# Patient Record
Sex: MALE | Employment: FULL TIME | URBAN - METROPOLITAN AREA
[De-identification: names, ages, dates, MRNs, and addresses within clinical notes are randomized per-mention and may not be internally consistent; named-entity substitution may affect disease eponyms.]

---

## 2019-07-09 ENCOUNTER — ANESTHESIA EVENT (OUTPATIENT)
Dept: OPERATING ROOM | Age: 49
End: 2019-07-09
Payer: COMMERCIAL

## 2019-07-09 ENCOUNTER — ANESTHESIA (OUTPATIENT)
Dept: OPERATING ROOM | Age: 49
End: 2019-07-09
Payer: COMMERCIAL

## 2019-07-09 ENCOUNTER — HOSPITAL ENCOUNTER (OUTPATIENT)
Age: 49
Setting detail: OUTPATIENT SURGERY
Discharge: HOME | End: 2019-07-09
Attending: OTOLARYNGOLOGY | Admitting: OTOLARYNGOLOGY
Payer: COMMERCIAL

## 2019-07-09 VITALS — DIASTOLIC BLOOD PRESSURE: 74 MMHG | OXYGEN SATURATION: 97 % | SYSTOLIC BLOOD PRESSURE: 122 MMHG

## 2019-07-09 VITALS
RESPIRATION RATE: 12 BRPM | TEMPERATURE: 97.2 F | SYSTOLIC BLOOD PRESSURE: 153 MMHG | WEIGHT: 300 LBS | BODY MASS INDEX: 38.5 KG/M2 | HEIGHT: 74 IN | DIASTOLIC BLOOD PRESSURE: 82 MMHG | OXYGEN SATURATION: 95 % | HEART RATE: 81 BPM

## 2019-07-09 LAB
GLUCOSE BLD-MCNC: 187 MG/DL (ref 60–115)
GLUCOSE BLD-MCNC: 192 MG/DL (ref 60–115)
PERFORMED ON: ABNORMAL
PERFORMED ON: ABNORMAL

## 2019-07-09 PROCEDURE — 3700000000 HC ANESTHESIA ATTENDED CARE: Performed by: OTOLARYNGOLOGY

## 2019-07-09 PROCEDURE — 7100000001 HC PACU RECOVERY - ADDTL 15 MIN: Performed by: OTOLARYNGOLOGY

## 2019-07-09 PROCEDURE — 6360000002 HC RX W HCPCS: Performed by: ANESTHESIOLOGY

## 2019-07-09 PROCEDURE — 3600000014 HC SURGERY LEVEL 4 ADDTL 15MIN: Performed by: OTOLARYNGOLOGY

## 2019-07-09 PROCEDURE — 2580000003 HC RX 258: Performed by: NURSE ANESTHETIST, CERTIFIED REGISTERED

## 2019-07-09 PROCEDURE — 7100000010 HC PHASE II RECOVERY - FIRST 15 MIN: Performed by: OTOLARYNGOLOGY

## 2019-07-09 PROCEDURE — 6370000000 HC RX 637 (ALT 250 FOR IP): Performed by: ANESTHESIOLOGY

## 2019-07-09 PROCEDURE — 6360000002 HC RX W HCPCS: Performed by: NURSE ANESTHETIST, CERTIFIED REGISTERED

## 2019-07-09 PROCEDURE — 6370000000 HC RX 637 (ALT 250 FOR IP): Performed by: OTOLARYNGOLOGY

## 2019-07-09 PROCEDURE — 2709999900 HC NON-CHARGEABLE SUPPLY: Performed by: OTOLARYNGOLOGY

## 2019-07-09 PROCEDURE — 2500000003 HC RX 250 WO HCPCS: Performed by: NURSE ANESTHETIST, CERTIFIED REGISTERED

## 2019-07-09 PROCEDURE — 3600000004 HC SURGERY LEVEL 4 BASE: Performed by: OTOLARYNGOLOGY

## 2019-07-09 PROCEDURE — 7100000000 HC PACU RECOVERY - FIRST 15 MIN: Performed by: OTOLARYNGOLOGY

## 2019-07-09 PROCEDURE — 7100000011 HC PHASE II RECOVERY - ADDTL 15 MIN: Performed by: OTOLARYNGOLOGY

## 2019-07-09 PROCEDURE — 2580000003 HC RX 258

## 2019-07-09 PROCEDURE — 3700000001 HC ADD 15 MINUTES (ANESTHESIA): Performed by: OTOLARYNGOLOGY

## 2019-07-09 RX ORDER — METOCLOPRAMIDE HYDROCHLORIDE 5 MG/ML
10 INJECTION INTRAMUSCULAR; INTRAVENOUS
Status: DISCONTINUED | OUTPATIENT
Start: 2019-07-09 | End: 2019-07-09 | Stop reason: HOSPADM

## 2019-07-09 RX ORDER — LEVOTHYROXINE SODIUM 0.12 MG/1
125 TABLET ORAL DAILY
COMMUNITY

## 2019-07-09 RX ORDER — ONDANSETRON 2 MG/ML
4 INJECTION INTRAMUSCULAR; INTRAVENOUS
Status: DISCONTINUED | OUTPATIENT
Start: 2019-07-09 | End: 2019-07-09 | Stop reason: HOSPADM

## 2019-07-09 RX ORDER — ONDANSETRON 2 MG/ML
4 INJECTION INTRAMUSCULAR; INTRAVENOUS EVERY 6 HOURS PRN
Status: CANCELLED | OUTPATIENT
Start: 2019-07-09

## 2019-07-09 RX ORDER — HYDROCODONE BITARTRATE AND ACETAMINOPHEN 5; 325 MG/1; MG/1
2 TABLET ORAL PRN
Status: COMPLETED | OUTPATIENT
Start: 2019-07-09 | End: 2019-07-09

## 2019-07-09 RX ORDER — OXYMETAZOLINE HYDROCHLORIDE 0.05 G/100ML
SPRAY NASAL PRN
Status: DISCONTINUED | OUTPATIENT
Start: 2019-07-09 | End: 2019-07-09 | Stop reason: ALTCHOICE

## 2019-07-09 RX ORDER — ONDANSETRON 2 MG/ML
INJECTION INTRAMUSCULAR; INTRAVENOUS PRN
Status: DISCONTINUED | OUTPATIENT
Start: 2019-07-09 | End: 2019-07-09 | Stop reason: SDUPTHER

## 2019-07-09 RX ORDER — ROCURONIUM BROMIDE 10 MG/ML
INJECTION, SOLUTION INTRAVENOUS PRN
Status: DISCONTINUED | OUTPATIENT
Start: 2019-07-09 | End: 2019-07-09 | Stop reason: SDUPTHER

## 2019-07-09 RX ORDER — MEPERIDINE HYDROCHLORIDE 25 MG/ML
12.5 INJECTION INTRAMUSCULAR; INTRAVENOUS; SUBCUTANEOUS EVERY 5 MIN PRN
Status: DISCONTINUED | OUTPATIENT
Start: 2019-07-09 | End: 2019-07-09 | Stop reason: HOSPADM

## 2019-07-09 RX ORDER — DEXAMETHASONE SODIUM PHOSPHATE 10 MG/ML
INJECTION INTRAMUSCULAR; INTRAVENOUS PRN
Status: DISCONTINUED | OUTPATIENT
Start: 2019-07-09 | End: 2019-07-09 | Stop reason: SDUPTHER

## 2019-07-09 RX ORDER — MIDAZOLAM HYDROCHLORIDE 1 MG/ML
INJECTION INTRAMUSCULAR; INTRAVENOUS PRN
Status: DISCONTINUED | OUTPATIENT
Start: 2019-07-09 | End: 2019-07-09 | Stop reason: SDUPTHER

## 2019-07-09 RX ORDER — FENTANYL CITRATE 50 UG/ML
50 INJECTION, SOLUTION INTRAMUSCULAR; INTRAVENOUS EVERY 10 MIN PRN
Status: DISCONTINUED | OUTPATIENT
Start: 2019-07-09 | End: 2019-07-09 | Stop reason: HOSPADM

## 2019-07-09 RX ORDER — SODIUM CHLORIDE 0.9 % (FLUSH) 0.9 %
10 SYRINGE (ML) INJECTION PRN
Status: CANCELLED | OUTPATIENT
Start: 2019-07-09

## 2019-07-09 RX ORDER — SODIUM CHLORIDE 0.9 % (FLUSH) 0.9 %
10 SYRINGE (ML) INJECTION EVERY 12 HOURS SCHEDULED
Status: CANCELLED | OUTPATIENT
Start: 2019-07-09

## 2019-07-09 RX ORDER — LIDOCAINE HYDROCHLORIDE 20 MG/ML
INJECTION, SOLUTION INTRAVENOUS PRN
Status: DISCONTINUED | OUTPATIENT
Start: 2019-07-09 | End: 2019-07-09 | Stop reason: SDUPTHER

## 2019-07-09 RX ORDER — FENTANYL CITRATE 50 UG/ML
INJECTION, SOLUTION INTRAMUSCULAR; INTRAVENOUS PRN
Status: DISCONTINUED | OUTPATIENT
Start: 2019-07-09 | End: 2019-07-09 | Stop reason: SDUPTHER

## 2019-07-09 RX ORDER — SODIUM CHLORIDE, SODIUM LACTATE, POTASSIUM CHLORIDE, CALCIUM CHLORIDE 600; 310; 30; 20 MG/100ML; MG/100ML; MG/100ML; MG/100ML
INJECTION, SOLUTION INTRAVENOUS
Status: COMPLETED
Start: 2019-07-09 | End: 2019-07-09

## 2019-07-09 RX ORDER — PROPOFOL 10 MG/ML
INJECTION, EMULSION INTRAVENOUS PRN
Status: DISCONTINUED | OUTPATIENT
Start: 2019-07-09 | End: 2019-07-09 | Stop reason: SDUPTHER

## 2019-07-09 RX ORDER — HYDROCODONE BITARTRATE AND ACETAMINOPHEN 5; 325 MG/1; MG/1
1 TABLET ORAL PRN
Status: COMPLETED | OUTPATIENT
Start: 2019-07-09 | End: 2019-07-09

## 2019-07-09 RX ORDER — SODIUM CHLORIDE, SODIUM LACTATE, POTASSIUM CHLORIDE, CALCIUM CHLORIDE 600; 310; 30; 20 MG/100ML; MG/100ML; MG/100ML; MG/100ML
INJECTION, SOLUTION INTRAVENOUS CONTINUOUS PRN
Status: DISCONTINUED | OUTPATIENT
Start: 2019-07-09 | End: 2019-07-09 | Stop reason: SDUPTHER

## 2019-07-09 RX ORDER — DIPHENHYDRAMINE HYDROCHLORIDE 50 MG/ML
12.5 INJECTION INTRAMUSCULAR; INTRAVENOUS
Status: DISCONTINUED | OUTPATIENT
Start: 2019-07-09 | End: 2019-07-09 | Stop reason: HOSPADM

## 2019-07-09 RX ADMIN — PROPOFOL 50 MG: 10 INJECTION, EMULSION INTRAVENOUS at 08:53

## 2019-07-09 RX ADMIN — SODIUM CHLORIDE, POTASSIUM CHLORIDE, SODIUM LACTATE AND CALCIUM CHLORIDE 125 ML: 600; 310; 30; 20 INJECTION, SOLUTION INTRAVENOUS at 07:15

## 2019-07-09 RX ADMIN — HYDROCODONE BITARTRATE AND ACETAMINOPHEN 1 TABLET: 5; 325 TABLET ORAL at 10:32

## 2019-07-09 RX ADMIN — SODIUM CHLORIDE, POTASSIUM CHLORIDE, SODIUM LACTATE AND CALCIUM CHLORIDE: 600; 310; 30; 20 INJECTION, SOLUTION INTRAVENOUS at 08:38

## 2019-07-09 RX ADMIN — MIDAZOLAM HYDROCHLORIDE 2 MG: 1 INJECTION, SOLUTION INTRAMUSCULAR; INTRAVENOUS at 08:38

## 2019-07-09 RX ADMIN — FENTANYL CITRATE 25 MCG: 50 INJECTION, SOLUTION INTRAMUSCULAR; INTRAVENOUS at 08:53

## 2019-07-09 RX ADMIN — ROCURONIUM BROMIDE 5 MG: 10 INJECTION INTRAVENOUS at 08:43

## 2019-07-09 RX ADMIN — FENTANYL CITRATE 50 MCG: 50 INJECTION, SOLUTION INTRAMUSCULAR; INTRAVENOUS at 09:35

## 2019-07-09 RX ADMIN — PROPOFOL 200 MG: 10 INJECTION, EMULSION INTRAVENOUS at 08:43

## 2019-07-09 RX ADMIN — FENTANYL CITRATE 50 MCG: 50 INJECTION, SOLUTION INTRAMUSCULAR; INTRAVENOUS at 09:46

## 2019-07-09 RX ADMIN — ONDANSETRON 4 MG: 2 INJECTION INTRAMUSCULAR; INTRAVENOUS at 08:48

## 2019-07-09 RX ADMIN — DEXAMETHASONE SODIUM PHOSPHATE 10 MG: 10 INJECTION INTRAMUSCULAR; INTRAVENOUS at 08:48

## 2019-07-09 RX ADMIN — FENTANYL CITRATE 25 MCG: 50 INJECTION, SOLUTION INTRAMUSCULAR; INTRAVENOUS at 08:51

## 2019-07-09 RX ADMIN — FENTANYL CITRATE 50 MCG: 50 INJECTION, SOLUTION INTRAMUSCULAR; INTRAVENOUS at 08:43

## 2019-07-09 RX ADMIN — LIDOCAINE HYDROCHLORIDE 80 MG: 20 INJECTION, SOLUTION INTRAVENOUS at 08:43

## 2019-07-09 ASSESSMENT — PAIN SCALES - GENERAL
PAINLEVEL_OUTOF10: 2
PAINLEVEL_OUTOF10: 5
PAINLEVEL_OUTOF10: 2
PAINLEVEL_OUTOF10: 4
PAINLEVEL_OUTOF10: 2

## 2019-07-09 ASSESSMENT — PAIN DESCRIPTION - PAIN TYPE
TYPE: SURGICAL PAIN
TYPE: SURGICAL PAIN

## 2019-07-09 ASSESSMENT — PULMONARY FUNCTION TESTS
PIF_VALUE: 15
PIF_VALUE: 21
PIF_VALUE: 17
PIF_VALUE: 13
PIF_VALUE: 15
PIF_VALUE: 4
PIF_VALUE: 28
PIF_VALUE: 1
PIF_VALUE: 9
PIF_VALUE: 15
PIF_VALUE: 12
PIF_VALUE: 16
PIF_VALUE: 0
PIF_VALUE: 4
PIF_VALUE: 22
PIF_VALUE: 1
PIF_VALUE: 7
PIF_VALUE: 3
PIF_VALUE: 5
PIF_VALUE: 17
PIF_VALUE: 15
PIF_VALUE: 3
PIF_VALUE: 18
PIF_VALUE: 21
PIF_VALUE: 0
PIF_VALUE: 16

## 2019-07-09 ASSESSMENT — PAIN DESCRIPTION - LOCATION
LOCATION: NOSE
LOCATION: NOSE

## 2019-07-09 ASSESSMENT — PAIN - FUNCTIONAL ASSESSMENT: PAIN_FUNCTIONAL_ASSESSMENT: 0-10

## 2019-07-09 NOTE — BRIEF OP NOTE
Brief Postoperative Note  ______________________________________________________________    Patient: Janey Rosa  YOB: 1970  MRN: [id number]  Date of Procedure: 7/9/2019    Pre-Op Diagnosis: DEVIATED NASAL SEPTUM    Post-Op Diagnosis: Same       Procedure(s):  SEPTOPLASTY REVISION    Anesthesia: General    Surgeon(s):   Tony Swift MD    Assistant:   Estimated Blood Loss (mL): less than 50     Complications: None    Specimens:   * No specimens in log *    Implants:  * No implants in log *      Drains: * No LDAs found *    Findings:     Tony Swift MD  Date: 7/9/2019  Time: 9:17 AM

## 2019-07-09 NOTE — ANESTHESIA PRE PROCEDURE
Evaluation    Airway: Mallampati: II  TM distance: >3 FB   Neck ROM: full  Mouth opening: > = 3 FB Dental: normal exam         Pulmonary: breath sounds clear to auscultation  (+) sleep apnea:                             Cardiovascular:    (+) hypertension:,         Rhythm: regular             Beta Blocker:  Dose within 24 Hrs         Neuro/Psych:   (+) neuromuscular disease:,             GI/Hepatic/Renal:   (+) morbid obesity          Endo/Other:    (+) DiabetesType II DM, , .                 Abdominal:           Vascular: negative vascular ROS. Anesthesia Plan      general     ASA 3       Induction: intravenous. MIPS: Postoperative opioids intended and Prophylactic antiemetics administered. Anesthetic plan and risks discussed with patient. Plan discussed with CRNA.     Attending anesthesiologist reviewed and agrees with Pre Eval content              Davide Farooq MD   7/9/2019

## 2019-07-09 NOTE — ANESTHESIA POSTPROCEDURE EVALUATION
Department of Anesthesiology  Postprocedure Note    Patient: Court Nyhan  MRN: [id number]  YOB: 1970  Date of evaluation: 7/9/2019  Time:  9:08 AM     Procedure Summary     Date:  07/09/19 Room / Location:  [facility] OR 12 / [facility] OR    Anesthesia Start:  0838 Anesthesia Stop:  9519    Procedure:  SEPTOPLASTY REVISION (N/A ) Diagnosis:  (DEVIATED NASAL SEPTUM)    Surgeon:  Maria Elena Rodríguez MD Responsible Provider:  Silvina Clayton MD    Anesthesia Type:  general ASA Status:  3          Anesthesia Type: general    Curtis Phase I: Curtis Score: 10    Curtis Phase II:      Last vitals: Reviewed and per EMR flowsheets.        Anesthesia Post Evaluation    Patient location during evaluation: PACU  Patient participation: complete - patient participated  Level of consciousness: awake  Pain score: 0  Airway patency: patent  Nausea & Vomiting: no nausea and no vomiting  Complications: no  Cardiovascular status: hemodynamically stable  Respiratory status: acceptable  Hydration status: euvolemic

## 2023-04-11 DIAGNOSIS — E78.5 DYSLIPIDEMIA: Primary | ICD-10-CM

## 2023-04-12 RX ORDER — PITAVASTATIN CALCIUM 4.18 MG/1
1 TABLET, FILM COATED ORAL DAILY
COMMUNITY
Start: 2018-01-17 | End: 2023-07-24 | Stop reason: ALTCHOICE

## 2023-04-12 RX ORDER — PITAVASTATIN CALCIUM 4.18 MG/1
TABLET, FILM COATED ORAL
Qty: 90 TABLET | Refills: 0 | Status: SHIPPED | OUTPATIENT
Start: 2023-04-12 | End: 2023-07-11

## 2023-04-13 DIAGNOSIS — E11.69 DIABETES MELLITUS TYPE 2 IN OBESE: Primary | ICD-10-CM

## 2023-04-13 DIAGNOSIS — I10 ESSENTIAL HYPERTENSION: ICD-10-CM

## 2023-04-13 DIAGNOSIS — E66.9 DIABETES MELLITUS TYPE 2 IN OBESE: Primary | ICD-10-CM

## 2023-04-13 PROBLEM — R29.898 WEAKNESS OF LEFT LOWER EXTREMITY: Status: ACTIVE | Noted: 2023-04-13

## 2023-04-13 PROBLEM — Z96.669 S/P ANKLE JOINT REPLACEMENT: Status: ACTIVE | Noted: 2023-04-13

## 2023-04-13 PROBLEM — E66.01 OBESITY, MORBID, BMI 40.0-49.9 (MULTI): Status: ACTIVE | Noted: 2023-04-13

## 2023-04-13 PROBLEM — J34.89 NASAL OBSTRUCTION: Status: ACTIVE | Noted: 2023-04-13

## 2023-04-13 PROBLEM — L65.9 HAIR LOSS: Status: ACTIVE | Noted: 2023-04-13

## 2023-04-13 PROBLEM — N52.9 MALE ERECTILE DISORDER OF ORGANIC ORIGIN: Status: ACTIVE | Noted: 2023-04-13

## 2023-04-13 PROBLEM — E78.5 DYSLIPIDEMIA: Status: ACTIVE | Noted: 2023-04-13

## 2023-04-13 PROBLEM — E03.9 HYPOTHYROIDISM, ADULT: Status: ACTIVE | Noted: 2023-04-13

## 2023-04-13 RX ORDER — AMLODIPINE BESYLATE 10 MG/1
TABLET ORAL
Qty: 90 TABLET | Refills: 0 | Status: SHIPPED | OUTPATIENT
Start: 2023-04-13 | End: 2023-07-12

## 2023-04-13 RX ORDER — PEN NEEDLE, DIABETIC 31 GX5/16"
NEEDLE, DISPOSABLE MISCELLANEOUS
Qty: 90 EACH | Refills: 0 | Status: SHIPPED | OUTPATIENT
Start: 2023-04-13 | End: 2023-07-12

## 2023-04-13 RX ORDER — METOPROLOL SUCCINATE 100 MG/1
TABLET, EXTENDED RELEASE ORAL
Qty: 270 TABLET | Refills: 0 | Status: SHIPPED | OUTPATIENT
Start: 2023-04-13 | End: 2023-07-12

## 2023-04-13 NOTE — TELEPHONE ENCOUNTER
"Requested Prescriptions     Pending Prescriptions Disp Refills    amLODIPine (Norvasc) 10 mg tablet [Pharmacy Med Name: AMLODIPINE BESYLATE TABS 10MG] 90 tablet 3     Sig: TAKE 1 TABLET DAILY    BD Ultra-Fine Mini Pen Needle 31 gauge x 3/16\" needle [Pharmacy Med Name: BD PEN BELKYS UF MINI 5MM 31G3/16] 90 each 3     Sig: USE ONE DAILY AS DIRECTED    metoprolol succinate XL (Toprol-XL) 100 mg 24 hr tablet [Pharmacy Med Name: METOPROLOL SUCCINATE ER TABS 100MG] 270 tablet 3     Sig: TAKE 3 TABLETS DAILY AT BEDTIME       "

## 2023-04-17 DIAGNOSIS — E66.9 DIABETES MELLITUS TYPE 2 IN OBESE: ICD-10-CM

## 2023-04-17 DIAGNOSIS — E11.69 DIABETES MELLITUS TYPE 2 IN OBESE: ICD-10-CM

## 2023-04-17 RX ORDER — PIOGLITAZONEHYDROCHLORIDE 45 MG/1
45 TABLET ORAL DAILY
Qty: 90 TABLET | Refills: 3 | Status: SHIPPED | OUTPATIENT
Start: 2023-04-17 | End: 2023-07-24 | Stop reason: SDUPTHER

## 2023-04-17 RX ORDER — GLIPIZIDE 10 MG/1
20 TABLET, FILM COATED, EXTENDED RELEASE ORAL DAILY
Qty: 180 TABLET | Refills: 3 | Status: SHIPPED | OUTPATIENT
Start: 2023-04-17 | End: 2023-07-24 | Stop reason: SDUPTHER

## 2023-04-18 ENCOUNTER — OFFICE VISIT (OUTPATIENT)
Dept: PRIMARY CARE | Facility: CLINIC | Age: 53
End: 2023-04-18
Payer: COMMERCIAL

## 2023-04-18 VITALS
DIASTOLIC BLOOD PRESSURE: 83 MMHG | BODY MASS INDEX: 41.47 KG/M2 | OXYGEN SATURATION: 94 % | WEIGHT: 315 LBS | RESPIRATION RATE: 18 BRPM | SYSTOLIC BLOOD PRESSURE: 134 MMHG | TEMPERATURE: 98.6 F | HEART RATE: 67 BPM
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
## 2023-04-18 DIAGNOSIS — M25.512 ACUTE PAIN OF LEFT SHOULDER: ICD-10-CM

## 2023-04-18 DIAGNOSIS — M25.511 ACUTE PAIN OF RIGHT SHOULDER: ICD-10-CM
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
## 2023-04-18 PROBLEM — J34.89 NASAL MASS: Status: ACTIVE | Noted: 2023-04-18

## 2023-04-18 PROBLEM — M25.559 HIP PAIN: Status: RESOLVED | Noted: 2023-04-18 | Resolved: 2023-04-18

## 2023-04-18 PROBLEM — T07.XXXA ABRASIONS OF MULTIPLE SITES: Status: ACTIVE | Noted: 2023-04-18

## 2023-04-18 PROBLEM — M25.672 STIFFNESS OF LEFT ANKLE, NOT ELSEWHERE CLASSIFIED: Status: ACTIVE | Noted: 2023-04-18

## 2023-04-18 PROBLEM — J32.3 CHRONIC SPHENOIDAL SINUSITIS: Status: RESOLVED | Noted: 2023-04-18 | Resolved: 2023-04-18

## 2023-04-18 PROBLEM — J34.89 ADHESIONS OF NASAL SEPTUM AND TURBINATES: Status: ACTIVE | Noted: 2023-04-18

## 2023-04-18 PROBLEM — M19.079 ANKLE ARTHRITIS: Status: ACTIVE | Noted: 2023-04-18

## 2023-04-18 PROBLEM — M25.579 ANKLE PAIN: Status: RESOLVED | Noted: 2023-04-18 | Resolved: 2023-04-18

## 2023-04-18 PROBLEM — S30.851A: Status: ACTIVE | Noted: 2023-04-18
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
## 2023-04-18 PROBLEM — J34.2 DEVIATED NASAL SEPTUM: Status: RESOLVED | Noted: 2023-04-18 | Resolved: 2023-04-18

## 2023-04-18 PROBLEM — U07.1 COVID-19: Status: RESOLVED | Noted: 2023-04-18 | Resolved: 2023-04-18

## 2023-04-18 PROCEDURE — 3079F DIAST BP 80-89 MM HG: CPT | Performed by: NURSE PRACTITIONER

## 2023-04-18 PROCEDURE — 4010F ACE/ARB THERAPY RXD/TAKEN: CPT | Performed by: NURSE PRACTITIONER

## 2023-04-18 PROCEDURE — 99214 OFFICE O/P EST MOD 30 MIN: CPT | Performed by: NURSE PRACTITIONER

## 2023-04-18 PROCEDURE — 3075F SYST BP GE 130 - 139MM HG: CPT | Performed by: NURSE PRACTITIONER

## 2023-04-18 RX ORDER — CLONIDINE HYDROCHLORIDE 0.1 MG/1
TABLET ORAL
COMMUNITY
Start: 2019-12-10 | End: 2023-07-24 | Stop reason: ALTCHOICE

## 2023-04-18 RX ORDER — INSULIN GLARGINE 100 [IU]/ML
INJECTION, SOLUTION SUBCUTANEOUS
COMMUNITY
Start: 2018-08-06 | End: 2023-07-24 | Stop reason: ALTCHOICE

## 2023-04-18 RX ORDER — HYDROCHLOROTHIAZIDE 25 MG/1
1 TABLET ORAL DAILY
COMMUNITY
Start: 2017-11-03 | End: 2023-05-31

## 2023-04-18 RX ORDER — MINERAL OIL
1 ENEMA (ML) RECTAL DAILY
COMMUNITY

## 2023-04-18 RX ORDER — DAPAGLIFLOZIN AND METFORMIN HYDROCHLORIDE 5; 1000 MG/1; MG/1
2 TABLET, FILM COATED, EXTENDED RELEASE ORAL DAILY
COMMUNITY
Start: 2018-02-01 | End: 2023-05-25 | Stop reason: SDUPTHER

## 2023-04-18 RX ORDER — OXYCODONE AND ACETAMINOPHEN 5; 325 MG/1; MG/1
TABLET ORAL
COMMUNITY
Start: 2023-04-14 | End: 2023-04-18 | Stop reason: ALTCHOICE

## 2023-04-18 RX ORDER — OXYCODONE AND ACETAMINOPHEN 5; 325 MG/1; MG/1
1 TABLET ORAL DAILY
Qty: 7 TABLET | Refills: 0 | Status: SHIPPED | OUTPATIENT
Start: 2023-04-18 | End: 2023-04-25

## 2023-04-18 RX ORDER — CANDESARTAN 16 MG/1
1 TABLET ORAL DAILY
COMMUNITY
Start: 2019-12-18 | End: 2023-05-09

## 2023-04-18 RX ORDER — MONTELUKAST SODIUM 10 MG/1
1 TABLET ORAL EVERY EVENING
COMMUNITY
Start: 2018-06-11 | End: 2023-05-09

## 2023-04-18 RX ORDER — LEVOTHYROXINE SODIUM 150 UG/1
2 TABLET ORAL DAILY
COMMUNITY
Start: 2017-11-20 | End: 2023-05-31

## 2023-04-18 RX ORDER — FINASTERIDE 1 MG/1
1 TABLET, FILM COATED ORAL DAILY
COMMUNITY
End: 2023-12-13 | Stop reason: SDUPTHER
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
## 2023-05-08 PROBLEM — G47.00 INSOMNIA: Status: ACTIVE | Noted: 2023-05-08

## 2023-05-08 PROBLEM — R05.9 COUGH: Status: RESOLVED | Noted: 2023-05-08 | Resolved: 2023-05-08

## 2023-05-08 PROBLEM — M79.643 HAND PAIN: Status: RESOLVED | Noted: 2023-05-08 | Resolved: 2023-05-08

## 2023-05-08 PROBLEM — J20.9 ACUTE BRONCHITIS: Status: RESOLVED | Noted: 2023-05-08 | Resolved: 2023-05-08

## 2023-05-08 PROBLEM — J01.90 ACUTE SINUSITIS: Status: RESOLVED | Noted: 2023-05-08 | Resolved: 2023-05-08

## 2023-05-08 PROBLEM — J45.901 ASTHMA EXACERBATION (HHS-HCC): Status: RESOLVED | Noted: 2023-05-08 | Resolved: 2023-05-08

## 2023-05-08 PROBLEM — M75.102 ROTATOR CUFF TEAR, LEFT: Status: RESOLVED | Noted: 2023-05-08 | Resolved: 2023-05-08

## 2023-05-25 DIAGNOSIS — E66.9 DIABETES MELLITUS TYPE 2 IN OBESE: Primary | ICD-10-CM

## 2023-05-25 DIAGNOSIS — E11.69 DIABETES MELLITUS TYPE 2 IN OBESE: Primary | ICD-10-CM

## 2023-05-25 RX ORDER — DAPAGLIFLOZIN AND METFORMIN HYDROCHLORIDE 5; 1000 MG/1; MG/1
2 TABLET, FILM COATED, EXTENDED RELEASE ORAL DAILY
Qty: 14 TABLET | Refills: 3 | Status: SHIPPED | OUTPATIENT
Start: 2023-05-25 | End: 2023-08-11

## 2023-05-25 NOTE — TELEPHONE ENCOUNTER
Requested Prescriptions     Pending Prescriptions Disp Refills    Xigduo XR 5-1,000 mg 14 tablet 0     Sig: Take 2 tablets by mouth once daily.

## 2023-07-12 DIAGNOSIS — E66.9 DIABETES MELLITUS TYPE 2 IN OBESE: ICD-10-CM

## 2023-07-12 DIAGNOSIS — I10 ESSENTIAL HYPERTENSION: ICD-10-CM

## 2023-07-12 DIAGNOSIS — E11.69 DIABETES MELLITUS TYPE 2 IN OBESE: ICD-10-CM

## 2023-07-12 RX ORDER — METOPROLOL SUCCINATE 100 MG/1
TABLET, EXTENDED RELEASE ORAL
Qty: 270 TABLET | Refills: 3 | Status: SHIPPED | OUTPATIENT
Start: 2023-07-12 | End: 2023-07-24 | Stop reason: SDUPTHER

## 2023-07-12 RX ORDER — AMLODIPINE BESYLATE 10 MG/1
TABLET ORAL
Qty: 90 TABLET | Refills: 3 | Status: SHIPPED | OUTPATIENT
Start: 2023-07-12 | End: 2023-09-11

## 2023-07-12 RX ORDER — PEN NEEDLE, DIABETIC 31 GX5/16"
NEEDLE, DISPOSABLE MISCELLANEOUS
Qty: 90 EACH | Refills: 3 | Status: SHIPPED | OUTPATIENT
Start: 2023-07-12 | End: 2023-12-28 | Stop reason: SDUPTHER

## 2023-07-12 NOTE — TELEPHONE ENCOUNTER
"Pharmacy requests prescription below    Last Office Visit: 4/18/2023     Requested Prescriptions     Pending Prescriptions Disp Refills    metoprolol succinate XL (Toprol-XL) 100 mg 24 hr tablet [Pharmacy Med Name: METOPROLOL SUCCINATE ER TABS 100MG] 270 tablet 3     Sig: TAKE 3 TABLETS DAILY AT BEDTIME    BD Ultra-Fine Mini Pen Needle 31 gauge x 3/16\" needle [Pharmacy Med Name: BD PEN BELKYS UF MINI 5MM 31G3/16] 90 each 3     Sig: USE ONE DAILY AS DIRECTED    amLODIPine (Norvasc) 10 mg tablet [Pharmacy Med Name: AMLODIPINE BESYLATE TABS 10MG] 90 tablet 3     Sig: TAKE 1 TABLET DAILY       "

## 2023-07-24 ENCOUNTER — LAB (OUTPATIENT)
Dept: LAB | Facility: LAB | Age: 53
End: 2023-07-24
Payer: COMMERCIAL

## 2023-07-24 ENCOUNTER — OFFICE VISIT (OUTPATIENT)
Dept: PRIMARY CARE | Facility: CLINIC | Age: 53
End: 2023-07-24
Payer: COMMERCIAL

## 2023-07-24 VITALS
RESPIRATION RATE: 18 BRPM | BODY MASS INDEX: 40.43 KG/M2 | WEIGHT: 315 LBS | HEART RATE: 70 BPM | HEIGHT: 74 IN | DIASTOLIC BLOOD PRESSURE: 84 MMHG | SYSTOLIC BLOOD PRESSURE: 120 MMHG | TEMPERATURE: 97.6 F

## 2023-07-24 DIAGNOSIS — Z12.5 PROSTATE CANCER SCREENING: ICD-10-CM

## 2023-07-24 DIAGNOSIS — E11.69 DIABETES MELLITUS TYPE 2 IN OBESE: Primary | ICD-10-CM

## 2023-07-24 DIAGNOSIS — E78.5 DYSLIPIDEMIA: ICD-10-CM

## 2023-07-24 DIAGNOSIS — E66.9 DIABETES MELLITUS TYPE 2 IN OBESE: Primary | ICD-10-CM

## 2023-07-24 DIAGNOSIS — I10 ESSENTIAL HYPERTENSION: ICD-10-CM

## 2023-07-24 DIAGNOSIS — E03.9 HYPOTHYROIDISM, ADULT: ICD-10-CM

## 2023-07-24 DIAGNOSIS — E66.9 DIABETES MELLITUS TYPE 2 IN OBESE: ICD-10-CM

## 2023-07-24 DIAGNOSIS — E11.69 DIABETES MELLITUS TYPE 2 IN OBESE: ICD-10-CM

## 2023-07-24 PROBLEM — M25.522 LEFT ELBOW PAIN: Status: ACTIVE | Noted: 2023-07-24

## 2023-07-24 PROBLEM — M25.812 OS ACROMIALE OF LEFT SHOULDER: Status: ACTIVE | Noted: 2023-07-24

## 2023-07-24 PROBLEM — M25.579 ANKLE PAIN: Status: ACTIVE | Noted: 2023-07-24

## 2023-07-24 PROBLEM — M25.512 LEFT SHOULDER PAIN: Status: ACTIVE | Noted: 2023-07-24

## 2023-07-24 LAB
ALANINE AMINOTRANSFERASE (SGPT) (U/L) IN SER/PLAS: 24 U/L (ref 10–52)
ALBUMIN (G/DL) IN SER/PLAS: 4.3 G/DL (ref 3.4–5)
ALKALINE PHOSPHATASE (U/L) IN SER/PLAS: 68 U/L (ref 33–120)
ANION GAP IN SER/PLAS: 12 MMOL/L (ref 10–20)
ASPARTATE AMINOTRANSFERASE (SGOT) (U/L) IN SER/PLAS: 19 U/L (ref 9–39)
BILIRUBIN TOTAL (MG/DL) IN SER/PLAS: 1.1 MG/DL (ref 0–1.2)
CALCIUM (MG/DL) IN SER/PLAS: 9.3 MG/DL (ref 8.6–10.3)
CARBON DIOXIDE, TOTAL (MMOL/L) IN SER/PLAS: 30 MMOL/L (ref 21–32)
CHLORIDE (MMOL/L) IN SER/PLAS: 103 MMOL/L (ref 98–107)
CHOLESTEROL (MG/DL) IN SER/PLAS: 137 MG/DL (ref 0–199)
CHOLESTEROL IN HDL (MG/DL) IN SER/PLAS: 25 MG/DL
CHOLESTEROL/HDL RATIO: 5.5
CREATININE (MG/DL) IN SER/PLAS: 0.88 MG/DL (ref 0.5–1.3)
ERYTHROCYTE DISTRIBUTION WIDTH (RATIO) BY AUTOMATED COUNT: 14.1 % (ref 11.5–14.5)
ERYTHROCYTE MEAN CORPUSCULAR HEMOGLOBIN CONCENTRATION (G/DL) BY AUTOMATED: 32.6 G/DL (ref 32–36)
ERYTHROCYTE MEAN CORPUSCULAR VOLUME (FL) BY AUTOMATED COUNT: 89 FL (ref 80–100)
ERYTHROCYTES (10*6/UL) IN BLOOD BY AUTOMATED COUNT: 5.31 X10E12/L (ref 4.5–5.9)
GFR MALE: >90 ML/MIN/1.73M2
GLUCOSE (MG/DL) IN SER/PLAS: 159 MG/DL (ref 74–99)
HEMATOCRIT (%) IN BLOOD BY AUTOMATED COUNT: 47.3 % (ref 41–52)
HEMOGLOBIN (G/DL) IN BLOOD: 15.4 G/DL (ref 13.5–17.5)
LDL: 35 MG/DL (ref 0–99)
LEUKOCYTES (10*3/UL) IN BLOOD BY AUTOMATED COUNT: 8.4 X10E9/L (ref 4.4–11.3)
NON HDL CHOLESTEROL: 112 MG/DL
PLATELETS (10*3/UL) IN BLOOD AUTOMATED COUNT: 180 X10E9/L (ref 150–450)
POTASSIUM (MMOL/L) IN SER/PLAS: 3.8 MMOL/L (ref 3.5–5.3)
PROSTATE SPECIFIC AG (NG/ML) IN SER/PLAS: 0.04 NG/ML (ref 0–4)
PROTEIN TOTAL: 7.1 G/DL (ref 6.4–8.2)
SODIUM (MMOL/L) IN SER/PLAS: 141 MMOL/L (ref 136–145)
THYROTROPIN (MIU/L) IN SER/PLAS BY DETECTION LIMIT <= 0.05 MIU/L: 3.87 MIU/L (ref 0.44–3.98)
TRIGLYCERIDE (MG/DL) IN SER/PLAS: 386 MG/DL (ref 0–149)
UREA NITROGEN (MG/DL) IN SER/PLAS: 20 MG/DL (ref 6–23)
VLDL: 77 MG/DL (ref 0–40)

## 2023-07-24 PROCEDURE — 84436 ASSAY OF TOTAL THYROXINE: CPT

## 2023-07-24 PROCEDURE — 99213 OFFICE O/P EST LOW 20 MIN: CPT | Performed by: FAMILY MEDICINE

## 2023-07-24 PROCEDURE — 85027 COMPLETE CBC AUTOMATED: CPT

## 2023-07-24 PROCEDURE — 80061 LIPID PANEL: CPT

## 2023-07-24 PROCEDURE — 36415 COLL VENOUS BLD VENIPUNCTURE: CPT

## 2023-07-24 PROCEDURE — 80053 COMPREHEN METABOLIC PANEL: CPT

## 2023-07-24 PROCEDURE — 83036 HEMOGLOBIN GLYCOSYLATED A1C: CPT

## 2023-07-24 PROCEDURE — 84443 ASSAY THYROID STIM HORMONE: CPT

## 2023-07-24 PROCEDURE — 84153 ASSAY OF PSA TOTAL: CPT

## 2023-07-24 RX ORDER — GLIPIZIDE 10 MG/1
20 TABLET, FILM COATED, EXTENDED RELEASE ORAL DAILY
Qty: 180 TABLET | Refills: 3 | Status: SHIPPED | OUTPATIENT
Start: 2023-07-24 | End: 2024-07-23

## 2023-07-24 RX ORDER — PITAVASTATIN CALCIUM 4.18 MG/1
1 TABLET, FILM COATED ORAL DAILY
Qty: 90 TABLET | Refills: 3 | Status: SHIPPED | OUTPATIENT
Start: 2023-07-24

## 2023-07-24 RX ORDER — METOPROLOL SUCCINATE 100 MG/1
300 TABLET, EXTENDED RELEASE ORAL NIGHTLY
Qty: 270 TABLET | Refills: 3 | Status: SHIPPED | OUTPATIENT
Start: 2023-07-24

## 2023-07-24 RX ORDER — PIOGLITAZONEHYDROCHLORIDE 45 MG/1
45 TABLET ORAL DAILY
Qty: 90 TABLET | Refills: 3 | Status: SHIPPED | OUTPATIENT
Start: 2023-07-24 | End: 2024-07-23

## 2023-07-24 ASSESSMENT — ENCOUNTER SYMPTOMS
NAUSEA: 0
WEAKNESS: 0
FREQUENCY: 0
SHORTNESS OF BREATH: 0
POLYPHAGIA: 0
NUMBNESS: 0
DIZZINESS: 0
HEADACHES: 0
APPETITE CHANGE: 0
CHEST TIGHTNESS: 0
FATIGUE: 0
MYALGIAS: 0
ARTHRALGIAS: 0
VOMITING: 0
POLYDIPSIA: 0
DIARRHEA: 0

## 2023-07-24 ASSESSMENT — PATIENT HEALTH QUESTIONNAIRE - PHQ9
1. LITTLE INTEREST OR PLEASURE IN DOING THINGS: NOT AT ALL
SUM OF ALL RESPONSES TO PHQ9 QUESTIONS 1 AND 2: 0
2. FEELING DOWN, DEPRESSED OR HOPELESS: NOT AT ALL

## 2023-07-24 NOTE — PROGRESS NOTES
Subjective   Patient ID: Al Ames is a 52 y.o. male who presents for Med Refill (6 month med check for DM. Pt states he has been doing well and has no new concerns.  ).  HPI    Review of Systems   Constitutional:  Negative for appetite change and fatigue.   Eyes:  Negative for visual disturbance.   Respiratory:  Negative for chest tightness and shortness of breath.    Cardiovascular:  Negative for chest pain and leg swelling.   Gastrointestinal:  Negative for diarrhea, nausea and vomiting.   Endocrine: Negative for polydipsia, polyphagia and polyuria.   Genitourinary:  Negative for frequency and urgency.   Musculoskeletal:  Negative for arthralgias and myalgias.   Neurological:  Negative for dizziness, syncope, weakness, numbness and headaches.       Objective   Physical Exam  Constitutional:       Appearance: Normal appearance.   HENT:      Head: Normocephalic and atraumatic.      Mouth/Throat:      Mouth: Mucous membranes are moist.   Eyes:      Extraocular Movements: Extraocular movements intact.      Conjunctiva/sclera: Conjunctivae normal.      Pupils: Pupils are equal, round, and reactive to light.      Funduscopic exam:     Right eye: No hemorrhage or AV nicking.         Left eye: No hemorrhage or AV nicking.   Cardiovascular:      Rate and Rhythm: Normal rate and regular rhythm.      Pulses: Normal pulses.      Heart sounds: Normal heart sounds.   Pulmonary:      Effort: Pulmonary effort is normal.      Breath sounds: Normal breath sounds.   Abdominal:      General: Abdomen is flat. Bowel sounds are normal.      Palpations: Abdomen is soft.   Musculoskeletal:         General: Normal range of motion.      Cervical back: Neck supple.      Right foot: No swelling or Charcot foot.      Left foot: No swelling or Charcot foot.   Skin:     General: Skin is warm and dry.      Findings: No wound.   Neurological:      General: No focal deficit present.      Mental Status: He is alert and oriented to person,  place, and time.      Sensory: No sensory deficit.      Motor: No weakness.   Psychiatric:         Mood and Affect: Mood normal.       Assessment/Plan   Problem List Items Addressed This Visit       Diabetes mellitus type 2 in obese (CMS/HCC) - Primary    Relevant Medications    glipiZIDE XL (Glucotrol XL) 10 mg 24 hr tablet    pioglitazone (Actos) 45 mg tablet    Other Relevant Orders    Albumin , Urine Random    CBC    Comprehensive Metabolic Panel    Hemoglobin A1C    Lipid Panel    Dyslipidemia    Relevant Medications    pitavastatin calcium (Livalo) 4 mg tablet    Other Relevant Orders    CBC    Lipid Panel    Essential hypertension    Relevant Medications    metoprolol succinate XL (Toprol-XL) 100 mg 24 hr tablet    Other Relevant Orders    CBC    Lipid Panel    Hypothyroidism, adult    Relevant Orders    CBC    Lipid Panel    Thyroid Stimulating Hormone    Thyroxine, Total     Other Visit Diagnoses       Prostate cancer screening        Relevant Orders    Prostate Specific Antigen

## 2023-07-25 LAB
ESTIMATED AVERAGE GLUCOSE FOR HBA1C: 197 MG/DL
HEMOGLOBIN A1C/HEMOGLOBIN TOTAL IN BLOOD: 8.5 %
THYROXINE (T4) (UG/DL) IN SER/PLAS: 10.5 UG/DL (ref 4.5–11.1)

## 2023-07-26 NOTE — TELEPHONE ENCOUNTER
----- Message from Van Pandey DO sent at 7/25/2023  4:31 PM EDT -----  Please call the patient regarding his abnormal result.  Callp this A1C was 8.5 would he be willing to have his trulicity increased to 3mg?

## 2023-12-13 ENCOUNTER — TELEPHONE (OUTPATIENT)
Dept: PRIMARY CARE | Facility: CLINIC | Age: 53
End: 2023-12-13
Payer: COMMERCIAL

## 2023-12-13 DIAGNOSIS — L65.9 HAIR LOSS: ICD-10-CM

## 2023-12-13 RX ORDER — FINASTERIDE 1 MG/1
1 TABLET, FILM COATED ORAL DAILY
Qty: 90 TABLET | Refills: 3 | Status: SHIPPED | OUTPATIENT
Start: 2023-12-13

## 2023-12-13 NOTE — TELEPHONE ENCOUNTER
Patient requests prescription below    Last Office Visit: 07/24/2023    Requested Prescriptions     Pending Prescriptions Disp Refills    finasteride (Propecia) 1 mg tablet 90 tablet 3     Sig: Take 1 tablet (1 mg) by mouth once daily.

## 2023-12-28 ENCOUNTER — TELEPHONE (OUTPATIENT)
Dept: PRIMARY CARE | Facility: CLINIC | Age: 53
End: 2023-12-28
Payer: COMMERCIAL

## 2023-12-28 DIAGNOSIS — E11.69 DIABETES MELLITUS TYPE 2 IN OBESE: ICD-10-CM

## 2023-12-28 DIAGNOSIS — E66.9 DIABETES MELLITUS TYPE 2 IN OBESE: ICD-10-CM

## 2023-12-28 RX ORDER — PEN NEEDLE, DIABETIC 30 GX3/16"
NEEDLE, DISPOSABLE MISCELLANEOUS
Qty: 90 EACH | Refills: 3 | Status: SHIPPED | OUTPATIENT
Start: 2023-12-28

## 2023-12-28 NOTE — TELEPHONE ENCOUNTER
"Patient requests prescription below    Last Office Visit: 7/24/2023     Requested Prescriptions     Pending Prescriptions Disp Refills    pen needle, diabetic (BD Ultra-Fine Mini Pen Needle) 31 gauge x 3/16\" needle 90 each 3     Sig: USE ONE DAILY AS DIRECTED       "

## 2023-12-28 NOTE — TELEPHONE ENCOUNTER
"PT needs refill for...    BD Ultra-Fine Mini Pen Needle 31 gauge x 3/16\" needle [[id number]]     Please send to [facility]  "

## 2024-01-25 ASSESSMENT — ENCOUNTER SYMPTOMS
POLYPHAGIA: 0
BLURRED VISION: 0
HEADACHES: 0
SPEECH DIFFICULTY: 0
WEAKNESS: 0
SEIZURES: 0
TREMORS: 0
CONFUSION: 0
HUNGER: 0
NERVOUS/ANXIOUS: 0
POLYDIPSIA: 0
DIZZINESS: 0
SWEATS: 0
FATIGUE: 0
WEIGHT LOSS: 0
BLACKOUTS: 0
VISUAL CHANGE: 0

## 2024-01-29 ENCOUNTER — OFFICE VISIT (OUTPATIENT)
Dept: PRIMARY CARE | Facility: CLINIC | Age: 54
End: 2024-01-29
Payer: COMMERCIAL

## 2024-01-29 ENCOUNTER — LAB (OUTPATIENT)
Dept: LAB | Facility: LAB | Age: 54
End: 2024-01-29
Payer: COMMERCIAL

## 2024-01-29 VITALS
DIASTOLIC BLOOD PRESSURE: 84 MMHG | BODY MASS INDEX: 40.43 KG/M2 | WEIGHT: 315 LBS | SYSTOLIC BLOOD PRESSURE: 120 MMHG | HEIGHT: 74 IN | HEART RATE: 71 BPM | RESPIRATION RATE: 18 BRPM | TEMPERATURE: 97.3 F

## 2024-01-29 DIAGNOSIS — E11.69 DIABETES MELLITUS TYPE 2 IN OBESE: ICD-10-CM

## 2024-01-29 DIAGNOSIS — E66.9 DIABETES MELLITUS TYPE 2 IN OBESE: ICD-10-CM

## 2024-01-29 DIAGNOSIS — I10 ESSENTIAL HYPERTENSION: ICD-10-CM

## 2024-01-29 DIAGNOSIS — E66.9 DIABETES MELLITUS TYPE 2 IN OBESE: Primary | ICD-10-CM

## 2024-01-29 DIAGNOSIS — E66.01 OBESITY, MORBID, BMI 40.0-49.9 (MULTI): ICD-10-CM

## 2024-01-29 DIAGNOSIS — E11.69 DIABETES MELLITUS TYPE 2 IN OBESE: Primary | ICD-10-CM

## 2024-01-29 LAB
EST. AVERAGE GLUCOSE BLD GHB EST-MCNC: 192 MG/DL
HBA1C MFR BLD: 8.3 %

## 2024-01-29 PROCEDURE — 83036 HEMOGLOBIN GLYCOSYLATED A1C: CPT

## 2024-01-29 PROCEDURE — 36415 COLL VENOUS BLD VENIPUNCTURE: CPT

## 2024-01-29 PROCEDURE — 99213 OFFICE O/P EST LOW 20 MIN: CPT | Performed by: FAMILY MEDICINE

## 2024-01-29 ASSESSMENT — ENCOUNTER SYMPTOMS
WEIGHT LOSS: 0
TREMORS: 0
SWEATS: 0
POLYDIPSIA: 0
BLURRED VISION: 0
HEADACHES: 0
FATIGUE: 0
CONFUSION: 0
NERVOUS/ANXIOUS: 0
SPEECH DIFFICULTY: 0
BLACKOUTS: 0
POLYPHAGIA: 0
WEAKNESS: 0
HUNGER: 0
DIZZINESS: 0
VISUAL CHANGE: 0
SEIZURES: 0

## 2024-01-29 NOTE — PROGRESS NOTES
Subjective   Patient ID: Al Ames is a 53 y.o. male who presents for Med Refill (6 month med check. Pt states he has been doing well and has no new concerns today. ).  Diabetes  He has type 2 diabetes mellitus. No MedicAlert identification noted. The initial diagnosis of diabetes was made 10 years ago. Pertinent negatives for hypoglycemia include no confusion, dizziness, headaches, hunger, mood changes, nervousness/anxiousness, pallor, seizures, sleepiness, speech difficulty, sweats or tremors. Pertinent negatives for diabetes include no blurred vision, no chest pain, no fatigue, no foot paresthesias, no foot ulcerations, no polydipsia, no polyphagia, no polyuria, no visual change, no weakness and no weight loss. Pertinent negatives for hypoglycemia complications include no blackouts, no hospitalization, no nocturnal hypoglycemia, no required assistance and no required glucagon injection. Pertinent negatives for diabetic complications include no CVA, heart disease, impotence, nephropathy, peripheral neuropathy, PVD or retinopathy. Risk factors for coronary artery disease include dyslipidemia, family history, hypertension, obesity, sedentary lifestyle and stress. Current diabetic treatment includes diet, insulin injections and oral agent (triple therapy). He is compliant with treatment some of the time. He is currently taking insulin at bedtime. Insulin injections are given by patient. Rotation sites for injection include the abdominal wall. He is following a generally unhealthy and high fat/cholesterol diet. Meal planning includes avoidance of concentrated sweets. He has not had a previous visit with a dietitian. He participates in exercise three times a week. He monitors blood glucose at home 1-2 x per week. He monitors urine at home <1 x per month. Blood glucose monitoring compliance is inadequate. He does not see a podiatrist.Eye exam is not current.       Review of Systems   Constitutional:  Negative for  fatigue and weight loss.   Eyes:  Negative for blurred vision.   Cardiovascular:  Negative for chest pain.   Endocrine: Negative for polydipsia, polyphagia and polyuria.   Genitourinary:  Negative for impotence.   Skin:  Negative for pallor.   Neurological:  Negative for dizziness, tremors, seizures, speech difficulty, weakness and headaches.   Psychiatric/Behavioral:  Negative for confusion. The patient is not nervous/anxious.        Objective   Physical Exam  Constitutional:       Appearance: Normal appearance.   HENT:      Head: Normocephalic and atraumatic.      Mouth/Throat:      Mouth: Mucous membranes are moist.   Eyes:      Extraocular Movements: Extraocular movements intact.      Conjunctiva/sclera: Conjunctivae normal.      Pupils: Pupils are equal, round, and reactive to light.      Funduscopic exam:     Right eye: No hemorrhage or AV nicking.         Left eye: No hemorrhage or AV nicking.   Cardiovascular:      Rate and Rhythm: Normal rate and regular rhythm.      Pulses: Normal pulses.      Heart sounds: Normal heart sounds.   Pulmonary:      Effort: Pulmonary effort is normal.      Breath sounds: Normal breath sounds.   Abdominal:      General: Abdomen is flat. Bowel sounds are normal.      Palpations: Abdomen is soft.   Musculoskeletal:         General: Normal range of motion.      Cervical back: Neck supple.      Right foot: No swelling or Charcot foot.      Left foot: No swelling or Charcot foot.   Skin:     General: Skin is warm and dry.      Findings: No wound.   Neurological:      General: No focal deficit present.      Mental Status: He is alert and oriented to person, place, and time.      Sensory: No sensory deficit.      Motor: No weakness.   Psychiatric:         Mood and Affect: Mood normal.         Assessment/Plan   Problem List Items Addressed This Visit             ICD-10-CM    Diabetes mellitus type 2 in obese (CMS/[facility]) - Primary E11.69, E66.9    Relevant Orders    Hemoglobin A1C     Essential hypertension I10    Obesity, morbid, BMI 40.0-49.9 (CMS/HCC) E66.01     HCC reviewed follow up yearly                 Van Pandey DO 01/29/24 5:43 PM

## 2024-02-26 DIAGNOSIS — E11.9 TYPE 2 DIABETES MELLITUS WITHOUT COMPLICATION, UNSPECIFIED WHETHER LONG TERM INSULIN USE (MULTI): ICD-10-CM

## 2024-02-26 RX ORDER — DULAGLUTIDE 1.5 MG/.5ML
INJECTION, SOLUTION SUBCUTANEOUS
Qty: 6 ML | Refills: 3 | Status: SHIPPED | OUTPATIENT
Start: 2024-02-26 | End: 2024-03-04 | Stop reason: SDUPTHER

## 2024-02-26 NOTE — TELEPHONE ENCOUNTER
Patient requests prescription below    Last Office Visit: 1/29/2024     Requested Prescriptions     Pending Prescriptions Disp Refills    dulaglutide (Trulicity) 1.5 mg/0.5 mL pen injector injection 6 mL 3     Sig: INJECT 1.5 MG ONCE WEEKLY

## 2024-03-04 ENCOUNTER — TELEPHONE (OUTPATIENT)
Dept: PRIMARY CARE | Facility: CLINIC | Age: 54
End: 2024-03-04
Payer: COMMERCIAL

## 2024-03-04 DIAGNOSIS — E11.9 TYPE 2 DIABETES MELLITUS WITHOUT COMPLICATION, UNSPECIFIED WHETHER LONG TERM INSULIN USE (MULTI): ICD-10-CM

## 2024-03-05 RX ORDER — DULAGLUTIDE 1.5 MG/.5ML
INJECTION, SOLUTION SUBCUTANEOUS
Qty: 6 ML | Refills: 3 | Status: SHIPPED | OUTPATIENT
Start: 2024-03-05 | End: 2024-03-06 | Stop reason: SDUPTHER

## 2024-03-06 DIAGNOSIS — E11.9 TYPE 2 DIABETES MELLITUS WITHOUT COMPLICATION, UNSPECIFIED WHETHER LONG TERM INSULIN USE (MULTI): ICD-10-CM

## 2024-03-06 RX ORDER — DULAGLUTIDE 1.5 MG/.5ML
INJECTION, SOLUTION SUBCUTANEOUS
Qty: 6 ML | Refills: 3 | Status: SHIPPED | OUTPATIENT
Start: 2024-03-06

## 2024-04-11 ENCOUNTER — OFFICE VISIT (OUTPATIENT)
Dept: PRIMARY CARE | Facility: CLINIC | Age: 54
End: 2024-04-11
Payer: COMMERCIAL

## 2024-04-11 VITALS
BODY MASS INDEX: 40.43 KG/M2 | OXYGEN SATURATION: 94 % | TEMPERATURE: 97.4 F | HEIGHT: 74 IN | DIASTOLIC BLOOD PRESSURE: 90 MMHG | WEIGHT: 315 LBS | SYSTOLIC BLOOD PRESSURE: 135 MMHG | RESPIRATION RATE: 20 BRPM | HEART RATE: 73 BPM

## 2024-04-11 DIAGNOSIS — K40.21 BILATERAL RECURRENT INGUINAL HERNIA WITHOUT OBSTRUCTION OR GANGRENE: Primary | ICD-10-CM

## 2024-04-11 PROCEDURE — 3008F BODY MASS INDEX DOCD: CPT | Performed by: STUDENT IN AN ORGANIZED HEALTH CARE EDUCATION/TRAINING PROGRAM

## 2024-04-11 PROCEDURE — 3079F DIAST BP 80-89 MM HG: CPT | Performed by: STUDENT IN AN ORGANIZED HEALTH CARE EDUCATION/TRAINING PROGRAM

## 2024-04-11 PROCEDURE — 4010F ACE/ARB THERAPY RXD/TAKEN: CPT | Performed by: STUDENT IN AN ORGANIZED HEALTH CARE EDUCATION/TRAINING PROGRAM

## 2024-04-11 PROCEDURE — 99213 OFFICE O/P EST LOW 20 MIN: CPT | Performed by: STUDENT IN AN ORGANIZED HEALTH CARE EDUCATION/TRAINING PROGRAM

## 2024-04-11 PROCEDURE — 3052F HG A1C>EQUAL 8.0%<EQUAL 9.0%: CPT | Performed by: STUDENT IN AN ORGANIZED HEALTH CARE EDUCATION/TRAINING PROGRAM

## 2024-04-11 PROCEDURE — 3075F SYST BP GE 130 - 139MM HG: CPT | Performed by: STUDENT IN AN ORGANIZED HEALTH CARE EDUCATION/TRAINING PROGRAM

## 2024-04-11 RX ORDER — ALBUTEROL SULFATE 90 UG/1
AEROSOL, METERED RESPIRATORY (INHALATION)
COMMUNITY
Start: 2018-03-19

## 2024-04-11 RX ORDER — LORATADINE 10 MG/1
TABLET ORAL
COMMUNITY

## 2024-04-11 RX ORDER — LIRAGLUTIDE 6 MG/ML
1.8 INJECTION SUBCUTANEOUS
COMMUNITY
Start: 2017-11-08

## 2024-04-11 RX ORDER — NAPROXEN SODIUM 220 MG
TABLET ORAL EVERY 12 HOURS
COMMUNITY
Start: 2020-06-05

## 2024-04-11 RX ORDER — FLUTICASONE PROPIONATE 50 MCG
SPRAY, SUSPENSION (ML) NASAL
COMMUNITY
Start: 2019-11-27

## 2024-04-11 ASSESSMENT — ENCOUNTER SYMPTOMS
HEMATURIA: 0
FLATUS: 0
NAUSEA: 0
ABDOMINAL PAIN: 1
CONSTIPATION: 0
FREQUENCY: 0
HEADACHES: 0
VOMITING: 0
DYSURIA: 0
MYALGIAS: 1
BELCHING: 0
DIARRHEA: 0
ARTHRALGIAS: 0
ANOREXIA: 0
WEIGHT LOSS: 0
FEVER: 0
HEMATOCHEZIA: 0

## 2024-04-11 NOTE — PROGRESS NOTES
Subjective   Al Ames is a 53 y.o. male who presents for Groin Pain (Right groin pain X 12 days ago after moving a safe.).  Abdominal Pain  This is a new problem. The current episode started in the past 7 days. The onset quality is sudden. The problem occurs 2 to 4 times per day. The most recent episode lasted 0.5 hours. The problem has been unchanged. The pain is at a severity of 3/10. The quality of the pain is cramping. Pertinent negatives include no anorexia, arthralgias, belching, constipation, diarrhea, dysuria, fever, flatus, frequency, headaches, hematochezia, hematuria, melena, nausea, vomiting or weight loss. The pain is aggravated by certain positions. The pain is relieved by Being still. He has tried nothing for the symptoms.     Pt here for possible inguinal hernia. Feeling pain in R groin since 4/6/24. Was helping a friend lift a safe. Uncomfortable, not worsening. Worse with lifting heavy objects. Feels a slight bulging in groin. Not worse with coughing, sneezing, or having bowel movements. 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 Doesn't regularly weightlift. No hx of abdominal surgery.     CT chest/abdomen/pelvis with contrast 4/13/2023 showed small fat-containing bilateral inguinal hernia and tiny fat-containing umbilical hernia.  There is also a small metallic density in the subcutaneous region of the left upper quadrant abdominal wall consistent with foreign body.      Visit Vitals  /90   Pulse 73   Temp 36.3 °C (97.4 °F)   Resp 20       Objective   Physical Exam  Constitutional:       General: He is not in acute distress.     Appearance: Normal appearance. He is obese.   HENT:      Head: Normocephalic.   Eyes:      Conjunctiva/sclera: Conjunctivae normal.   Cardiovascular:      Rate and Rhythm: Normal rate and regular rhythm.      Heart sounds: Normal heart sounds.   Pulmonary:      Effort: Pulmonary effort is normal. No  respiratory distress.      Breath sounds: Normal breath sounds.   Abdominal:      General: Abdomen is protuberant. Bowel sounds are normal.      Palpations: Abdomen is soft.      Tenderness: There is no abdominal tenderness.      Comments: Mildly prominent panniculus. Right inguinal fullness without distinct mass.  Does not change with coughing, Valsalva, or position change standing to supine. No mass palpable through R inguinal canal from scrotum.   Neurological:      Mental Status: He is alert.   Psychiatric:         Mood and Affect: Mood normal.         Behavior: Behavior normal.         Assessment/Plan   Problem List Items Addressed This Visit    None  Visit Diagnoses       Bilateral recurrent inguinal hernia without obstruction or gangrene    -  Primary    Relevant Orders    Referral to General Surgery          53-year-old male with known bilateral-containing inguinal hernias, with acute worsening of right side after lifting heavy objects.  No red flags suggestive of bowel strangulation/incarceration.  Would recommend elective repair of right side, potentially left side as well.  Referred to general surgery.  Instructed to not lift heavier than 40 to 50 pounds.  Instructed to go to the ED for worsening abdominal pain, increasing bulging, nausea/vomiting, fevers, inability to have bowel movement.    Santiago Al MD  PGY-3  Family Medicine

## 2024-04-12 NOTE — PROGRESS NOTES
I reviewed the resident/fellow's documentation and discussed the patient with the resident/fellow. I agree with the resident/fellow's medical decision making as documented in the note.     Rossana Moncada MD

## 2024-04-16 ENCOUNTER — LAB (OUTPATIENT)
Dept: LAB | Facility: LAB | Age: 54
End: 2024-04-16
Payer: COMMERCIAL

## 2024-04-16 ENCOUNTER — OFFICE VISIT (OUTPATIENT)
Dept: SURGERY | Facility: CLINIC | Age: 54
End: 2024-04-16
Payer: COMMERCIAL

## 2024-04-16 VITALS
OXYGEN SATURATION: 94 % | RESPIRATION RATE: 20 BRPM | TEMPERATURE: 97.1 F | BODY MASS INDEX: 40.43 KG/M2 | WEIGHT: 315 LBS | DIASTOLIC BLOOD PRESSURE: 79 MMHG | HEART RATE: 70 BPM | HEIGHT: 74 IN | SYSTOLIC BLOOD PRESSURE: 123 MMHG

## 2024-04-16 DIAGNOSIS — K40.21 BILATERAL RECURRENT INGUINAL HERNIA WITHOUT OBSTRUCTION OR GANGRENE: ICD-10-CM

## 2024-04-16 LAB
CREAT SERPL-MCNC: 0.86 MG/DL (ref 0.5–1.3)
EGFRCR SERPLBLD CKD-EPI 2021: >90 ML/MIN/1.73M*2

## 2024-04-16 PROCEDURE — 36415 COLL VENOUS BLD VENIPUNCTURE: CPT

## 2024-04-16 PROCEDURE — 82565 ASSAY OF CREATININE: CPT

## 2024-04-16 PROCEDURE — 3052F HG A1C>EQUAL 8.0%<EQUAL 9.0%: CPT | Performed by: SURGERY

## 2024-04-16 PROCEDURE — 99213 OFFICE O/P EST LOW 20 MIN: CPT | Performed by: SURGERY

## 2024-04-16 PROCEDURE — 4010F ACE/ARB THERAPY RXD/TAKEN: CPT | Performed by: SURGERY

## 2024-04-16 PROCEDURE — 3078F DIAST BP <80 MM HG: CPT | Performed by: SURGERY

## 2024-04-16 PROCEDURE — 3074F SYST BP LT 130 MM HG: CPT | Performed by: SURGERY

## 2024-04-16 NOTE — PROGRESS NOTES
Al Ames   [id number]   1970         Chief Complaint/      Possible hernia            HPI/    53-year-old male seen for right groin discomfort possible hernia    Patient states he has right groin discomfort.'s been going on for several months.  Seems to be worse when he does a lot of heavy lifting.  Sometimes he thinks that there is a lump on the right side    Patient denies any issues on the left    Denies any prior hernia surgery        Past Medical History:   Diagnosis Date    Actinic keratosis     Actinic keratoses    Acute bronchitis 05/08/2023    Acute sinusitis 05/08/2023    ADHD (attention deficit hyperactivity disorder)     Allergic     Ankle pain 04/18/2023    Arthritis     Asthma ([facility]-HCC)     Chronic sinusitis, unspecified     Sinobronchitis    Chronic sphenoidal sinusitis 04/18/2023    Conjunctival hemorrhage, left eye     Traumatic subconjunctival hemorrhage of left eye    Cough 05/08/2023    COVID-19 04/18/2023    Deviated nasal septum 04/18/2023    Encounter for screening for malignant neoplasm of skin     Skin cancer screening    Fatty (change of) liver, not elsewhere classified     Fatty liver disease, nonalcoholic    Hand pain 05/08/2023    Hypertension     Induration penis plastica     Peyronie's disease    Laceration without foreign body of other part of head, initial encounter     Face lacerations    Other general symptoms and signs     Flu-like symptoms    Personal history of diseases of the skin and subcutaneous tissue     History of solar lentigo    Personal history of other diseases of the female genital tract 11/18/2019    History of abnormal uterine bleeding    Personal history of other diseases of the respiratory system 07/22/2019    History of acute sinusitis    Personal history of other diseases of the respiratory system     History of extrinsic asthma    Personal history of other diseases of the respiratory system     History of bronchitis    Personal history of other  endocrine, nutritional and metabolic disease     History of hypothyroidism    Personal history of other infectious and parasitic diseases     History of scabies    Personal history of other infectious and parasitic diseases     History of viral gastroenteritis    Personal history of traumatic brain injury     History of concussion    Pure hyperglyceridemia     High triglycerides    Rotator cuff tear, left 05/08/2023    Type 2 diabetes mellitus without complications (Multi) 08/14/2019    Type 2 diabetes mellitus without complication, without long-term current use of insulin    Unspecified otitis externa, unspecified ear     Otitis externa      Morbid obesity with BMI greater than 40    IDDM    Hypertension    Elevated lipids    Hypothyroid    Asthma    Cigar smoker      Social History     Socioeconomic History    Marital status:      Spouse name: Not on file    Number of children: Not on file    Years of education: Not on file    Highest education level: Not on file   Occupational History    Not on file   Tobacco Use    Smoking status: Some Days     Types: Cigars     Passive exposure: Current    Smokeless tobacco: Never   Substance and Sexual Activity    Alcohol use: Yes    Drug use: Not Currently    Sexual activity: Yes     Partners: Female     Birth control/protection: Other   Other Topics Concern    Not on file   Social History Narrative    Not on file     Social Determinants of Health     Financial Resource Strain: Not on file   Food Insecurity: Not on file   Transportation Needs: Not on file   Physical Activity: Not on file   Stress: Not on file   Social Connections: Not on file   Intimate Partner Violence: Not on file   Housing Stability: Not on file        Smokes cigars  Family History   Problem Relation Name Age of Onset    Arthritis Mother Renetta Hui     Heart disease Father Al Ames Sr     Hypertension Father Al Ames Sr     Asthma Sister Florencia Spear     Diabetes Sister Florencia Spear   "       Review of Systems   All other systems reviewed and are negative.         Current Outpatient Medications:     albuterol (ProAir HFA) 90 mcg/actuation inhaler, Inhale., Disp: , Rfl:     amLODIPine (Norvasc) 10 mg tablet, Take 1 tablet (10 mg) by mouth once daily., Disp: 90 tablet, Rfl: 3    candesartan (Atacand) 16 mg tablet, TAKE 1 TABLET DAILY, Disp: 90 tablet, Rfl: 3    dulaglutide (Trulicity) 1.5 mg/0.5 mL pen injector injection, INJECT 1.5 MG ONCE WEEKLY, Disp: 6 mL, Rfl: 3    fexofenadine (Allegra) 180 mg tablet, Take 1 tablet (180 mg) by mouth once daily., Disp: , Rfl:     finasteride (Propecia) 1 mg tablet, Take 1 tablet (1 mg) by mouth once daily., Disp: 90 tablet, Rfl: 3    fluticasone (Flonase) 50 mcg/actuation nasal spray, Administer into affected nostril(s). As needed, Disp: , Rfl:     glipiZIDE XL (Glucotrol XL) 10 mg 24 hr tablet, Take 2 tablets (20 mg) by mouth once daily. Do not crush, chew, or split., Disp: 180 tablet, Rfl: 3    hydroCHLOROthiazide (HYDRODiuril) 25 mg tablet, TAKE 1 TABLET DAILY, Disp: 90 tablet, Rfl: 3    levothyroxine (Synthroid, Levoxyl) 150 mcg tablet, TAKE 2 TABLETS DAILY, Disp: 180 tablet, Rfl: 3    loratadine (Claritin) 10 mg tablet, Take by mouth., Disp: , Rfl:     metoprolol succinate XL (Toprol-XL) 100 mg 24 hr tablet, Take 3 tablets (300 mg) by mouth once daily at bedtime. Do not crush or chew., Disp: 270 tablet, Rfl: 3    montelukast (Singulair) 10 mg tablet, TAKE 1 TABLET IN THE EVENING, Disp: 90 tablet, Rfl: 3    naproxen sodium (Aleve) 220 mg tablet, Take by mouth every 12 hours., Disp: , Rfl:     pen needle, diabetic (BD Ultra-Fine Mini Pen Needle) 31 gauge x 3/16\" needle, USE ONE DAILY AS DIRECTED, Disp: 90 each, Rfl: 3    pioglitazone (Actos) 45 mg tablet, Take 1 tablet (45 mg) by mouth once daily., Disp: 90 tablet, Rfl: 3    pitavastatin calcium (Livalo) 4 mg tablet, Take 1 tablet by mouth once daily., Disp: 90 tablet, Rfl: 3    Semglee,insulin " glarg-yfgn,Pen 100 unit/mL (3 mL) insulin pen, INJECT 45 UNITS DAILY, Disp: 45 mL, Rfl: 3    Xigduo XR 5-1,000 mg, TAKE 2 TABLETS DAILY, Disp: 180 tablet, Rfl: 3    liraglutide (Victoza 2-Lior) 0.6 mg/0.1 mL (18 mg/3 mL) injection, Inject 0.3 mL (1.8 mg) under the skin., Disp: , Rfl:        no      Xaralto  no      Eliquis  no      Coumadin  no      Plavix        Allergies   Allergen Reactions    Amoxicillin Unknown    Other Other and Unknown     Cooked pepperoni    Statins-Hmg-Coa Reductase Inhibitors Unknown        MR shoulder  Narrative: Interpreted By:  SUDHAKAR IQBAL MD  MRN: [id number]  Patient Name: HIMANSHU OCONNOR     STUDY:  MRI SHOULDER W/O CONTRAST;     INDICATION:  pain, weakness  M75.102: Rotator cuff tear, left.     COMPARISON:  April 18, 2023     ACCESSION NUMBER(S):  [id number]     ORDERING CLINICIAN:  DESI SALDIVAR     TECHNIQUE:  Routine multiplanar multisequential MRI left shoulder without  contrast.     FINDINGS:  Evaluation of the rotator cuff demonstrates no evidence of tear.  Rotator cuff tendons have a normal signal and morphology. Rotator  cuff musculature normal without atrophy or edema.     Biceps intact.     There is advanced left glenohumeral osteoarthritis with large areas  of chondral loss from the glenoid surface and sizable subchondral  cysts.     There is extensive nearly circumferential degenerative labral tearing  involving the superior, posterosuperior, posteroinferior, and  inferior labrum.     No glenohumeral ligamentous abnormality.     No sizeable effusion.     No focal fluid collections.     Note made of an os acromiale, anatomic variant. Acromioclavicular  joint normal.     No evidence of fracture.     No marrow replacement lesion. Alignment normal.     No evidence of soft tissue mass.        Impression: Advanced left glenohumeral osteoarthritis with nearly circumferential  degenerative labral tearing.     Incidental note made of an os acromiale left shoulder.       I have reviewed  "the images and the reports      Laboratory data:   CBC:   Lab Results   Component Value Date    WBC 8.4 07/24/2023    RBC 5.31 07/24/2023    HGB 15.4 07/24/2023    HCT 47.3 07/24/2023     07/24/2023   ]   [facility] Labs:   Lab Results   Component Value Date     07/24/2023    K 3.8 07/24/2023     07/24/2023    CO2 30 07/24/2023    BUN 20 07/24/2023    CREATININE 0.88 07/24/2023    CALCIUM 9.3 07/24/2023    PROT 7.1 07/24/2023    ALBUMIN 4.3 07/24/2023    BILITOT 1.1 07/24/2023    ALKPHOS 68 07/24/2023    AST 19 07/24/2023    ALT 24 07/24/2023          I have reviewed the above laboratory studies      /79 (BP Location: Right arm, Patient Position: Sitting, BP Cuff Size: Large adult)   Pulse 70   Temp 36.2 °C (97.1 °F) (Temporal)   Resp 20   Ht 1.88 m (6' 2\")   Wt (!) 150 kg (331 lb 9.6 oz)   SpO2 94%   BMI 42.57 kg/m²        Physical Exam  Constitutional:       Comments: Morbidly obese   HENT:      Head: Normocephalic and atraumatic.   Cardiovascular:      Rate and Rhythm: Normal rate and regular rhythm.   Pulmonary:      Effort: Pulmonary effort is normal.      Breath sounds: Normal breath sounds.   Abdominal:      Comments: No obvious hernia either groin   Musculoskeletal:      Cervical back: Normal range of motion.   Skin:     General: Skin is warm.   Neurological:      General: No focal deficit present.      Mental Status: He is oriented to person, place, and time.                  Assessment/    Right groin discomfort, possible hernia    Plan/    I cannot confirm a hernia on exam.    Given the patient's morbid obesity, it certainly possible he could have a small hernia.  Possibly give her hernia is picking up to cause him discomfort yet I am unable to palpate it.    I think the chance of an incarceration event should he have a hernia is very low    Will check a CT scan of the abdomen pelvis looking for further information    "

## 2024-04-17 ENCOUNTER — HOSPITAL ENCOUNTER (OUTPATIENT)
Dept: RADIOLOGY | Facility: CLINIC | Age: 54
Discharge: HOME | End: 2024-04-17
Payer: COMMERCIAL

## 2024-04-17 DIAGNOSIS — K40.21 BILATERAL RECURRENT INGUINAL HERNIA WITHOUT OBSTRUCTION OR GANGRENE: ICD-10-CM

## 2024-04-17 PROCEDURE — 74177 CT ABD & PELVIS W/CONTRAST: CPT

## 2024-04-17 PROCEDURE — 74177 CT ABD & PELVIS W/CONTRAST: CPT | Performed by: STUDENT IN AN ORGANIZED HEALTH CARE EDUCATION/TRAINING PROGRAM

## 2024-04-17 PROCEDURE — 2550000001 HC RX 255 CONTRASTS: Performed by: SURGERY

## 2024-04-17 RX ADMIN — IOHEXOL 75 ML: 350 INJECTION, SOLUTION INTRAVENOUS at 13:50

## 2024-04-23 ENCOUNTER — OFFICE VISIT (OUTPATIENT)
Dept: SURGERY | Facility: CLINIC | Age: 54
End: 2024-04-23
Payer: COMMERCIAL

## 2024-04-23 VITALS
HEART RATE: 66 BPM | OXYGEN SATURATION: 98 % | TEMPERATURE: 96.1 F | RESPIRATION RATE: 16 BRPM | DIASTOLIC BLOOD PRESSURE: 89 MMHG | SYSTOLIC BLOOD PRESSURE: 164 MMHG

## 2024-04-23 DIAGNOSIS — K40.21 BILATERAL RECURRENT INGUINAL HERNIA WITHOUT OBSTRUCTION OR GANGRENE: Primary | ICD-10-CM

## 2024-04-23 PROCEDURE — 4010F ACE/ARB THERAPY RXD/TAKEN: CPT | Performed by: SURGERY

## 2024-04-23 PROCEDURE — 3077F SYST BP >= 140 MM HG: CPT | Performed by: SURGERY

## 2024-04-23 PROCEDURE — 3079F DIAST BP 80-89 MM HG: CPT | Performed by: SURGERY

## 2024-04-23 PROCEDURE — 99213 OFFICE O/P EST LOW 20 MIN: CPT | Performed by: SURGERY

## 2024-04-23 PROCEDURE — 3052F HG A1C>EQUAL 8.0%<EQUAL 9.0%: CPT | Performed by: SURGERY

## 2024-04-23 NOTE — PROGRESS NOTES
Al Ames   [id number]   1970       Chief Complaint/    Possible hernias      HPI/    53-year-old male with possible inguinal hernias    Prior physical exam did not demonstrate a hernia    Since have last seen the patient a CT of the abdomen pelvis was carried out.  It suggest that there may be small bilateral fat-containing hernias    Patient reports that since last visit he has had no further groin pain          Past Medical History:   Diagnosis Date    Actinic keratosis     Actinic keratoses    Acute bronchitis 05/08/2023    Acute sinusitis 05/08/2023    ADHD (attention deficit hyperactivity disorder)     Allergic     Ankle pain 04/18/2023    Arthritis     Asthma (HHS-HCC)     Chronic sinusitis, unspecified     Sinobronchitis    Chronic sphenoidal sinusitis 04/18/2023    Conjunctival hemorrhage, left eye     Traumatic subconjunctival hemorrhage of left eye    Cough 05/08/2023    COVID-19 04/18/2023    Deviated nasal septum 04/18/2023    Encounter for screening for malignant neoplasm of skin     Skin cancer screening    Fatty (change of) liver, not elsewhere classified     Fatty liver disease, nonalcoholic    Hand pain 05/08/2023    Hypertension     Induration penis plastica     Peyronie's disease    Laceration without foreign body of other part of head, initial encounter     Face lacerations    Other general symptoms and signs     Flu-like symptoms    Personal history of diseases of the skin and subcutaneous tissue     History of solar lentigo    Personal history of other diseases of the female genital tract 11/18/2019    History of abnormal uterine bleeding    Personal history of other diseases of the respiratory system 07/22/2019    History of acute sinusitis    Personal history of other diseases of the respiratory system     History of extrinsic asthma    Personal history of other diseases of the respiratory system     History of bronchitis    Personal history of other endocrine, nutritional and  metabolic disease     History of hypothyroidism    Personal history of other infectious and parasitic diseases     History of scabies    Personal history of other infectious and parasitic diseases     History of viral gastroenteritis    Personal history of traumatic brain injury     History of concussion    Pure hyperglyceridemia     High triglycerides    Rotator cuff tear, left 05/08/2023    Type 2 diabetes mellitus without complications (Multi) 08/14/2019    Type 2 diabetes mellitus without complication, without long-term current use of insulin    Unspecified otitis externa, unspecified ear     Otitis externa          Current Outpatient Medications:     albuterol (ProAir HFA) 90 mcg/actuation inhaler, Inhale., Disp: , Rfl:     amLODIPine (Norvasc) 10 mg tablet, Take 1 tablet (10 mg) by mouth once daily., Disp: 90 tablet, Rfl: 3    candesartan (Atacand) 16 mg tablet, TAKE 1 TABLET DAILY, Disp: 90 tablet, Rfl: 3    dulaglutide (Trulicity) 1.5 mg/0.5 mL pen injector injection, INJECT 1.5 MG ONCE WEEKLY, Disp: 6 mL, Rfl: 3    fexofenadine (Allegra) 180 mg tablet, Take 1 tablet (180 mg) by mouth once daily., Disp: , Rfl:     finasteride (Propecia) 1 mg tablet, Take 1 tablet (1 mg) by mouth once daily., Disp: 90 tablet, Rfl: 3    fluticasone (Flonase) 50 mcg/actuation nasal spray, Administer into affected nostril(s). As needed, Disp: , Rfl:     glipiZIDE XL (Glucotrol XL) 10 mg 24 hr tablet, Take 2 tablets (20 mg) by mouth once daily. Do not crush, chew, or split., Disp: 180 tablet, Rfl: 3    hydroCHLOROthiazide (HYDRODiuril) 25 mg tablet, TAKE 1 TABLET DAILY, Disp: 90 tablet, Rfl: 3    levothyroxine (Synthroid, Levoxyl) 150 mcg tablet, TAKE 2 TABLETS DAILY, Disp: 180 tablet, Rfl: 3    liraglutide (Victoza 2-Lior) 0.6 mg/0.1 mL (18 mg/3 mL) injection, Inject 0.3 mL (1.8 mg) under the skin., Disp: , Rfl:     loratadine (Claritin) 10 mg tablet, Take by mouth., Disp: , Rfl:     metoprolol succinate XL (Toprol-XL) 100 mg 24  "hr tablet, Take 3 tablets (300 mg) by mouth once daily at bedtime. Do not crush or chew., Disp: 270 tablet, Rfl: 3    montelukast (Singulair) 10 mg tablet, TAKE 1 TABLET IN THE EVENING, Disp: 90 tablet, Rfl: 3    naproxen sodium (Aleve) 220 mg tablet, Take by mouth every 12 hours., Disp: , Rfl:     pen needle, diabetic (BD Ultra-Fine Mini Pen Needle) 31 gauge x 3/16\" needle, USE ONE DAILY AS DIRECTED, Disp: 90 each, Rfl: 3    pioglitazone (Actos) 45 mg tablet, Take 1 tablet (45 mg) by mouth once daily., Disp: 90 tablet, Rfl: 3    pitavastatin calcium (Livalo) 4 mg tablet, Take 1 tablet by mouth once daily., Disp: 90 tablet, Rfl: 3    Semglee,insulin glarg-yfgn,Pen 100 unit/mL (3 mL) Pen, INJECT 45 UNITS DAILY, Disp: 45 mL, Rfl: 3    Xigduo XR 5-1,000 mg, TAKE 2 TABLETS DAILY, Disp: 180 tablet, Rfl: 3     Allergies   Allergen Reactions    Amoxicillin Unknown    Other Other and Unknown     Cooked pepperoni    Statins-Hmg-Coa Reductase Inhibitors Unknown        [unfilled]     CT abdomen pelvis w IV contrast  Narrative: Interpreted By:  River Dela Cruz,   STUDY:  CT ABDOMEN PELVIS W IV CONTRAST;  4/17/2024 1:30 pm      INDICATION:  Signs/Symptoms:right groin hernia.      COMPARISON:  CT chest, abdomen and pelvis 04/13/2023      ACCESSION NUMBER(S):  [id number]      ORDERING CLINICIAN:  ALFREDO BARTON      TECHNIQUE:  CT of the abdomen and pelvis was performed.  Standard contiguous  axial images were obtained at 3 mm slice thickness through the  abdomen and pelvis. Coronal and sagittal reconstructions at 3 mm  slice thickness were performed.      75 ml of contrast Omnipaque 350 were administered intravenously  without immediate complication.      FINDINGS:  LOWER CHEST:  Moderate coronary artery calcifications.      ABDOMEN:      LIVER:  The liver is enlarged measuring 25 cm in craniocaudal dimension and  demonstrates diffusely decreased attenuation suggesting steatosis. No  focal lesion.      BILE DUCTS:  The " intrahepatic and extrahepatic ducts are not dilated.      GALLBLADDER:  The gallbladder is nondistended and without evidence of radiopaque  stones.      PANCREAS:  The pancreas appears unremarkable without evidence of ductal  dilatation or masses.      SPLEEN:  The spleen is enlarged measuring 15.2 cm in craniocaudal extension.  No focal lesion.      ADRENAL GLANDS:  Bilateral adrenal glands appear normal.      KIDNEYS AND URETERS:  The kidneys are normal in size and enhance symmetrically.  Subcentimeter low-attenuation lesion right interpolar region, likely  a simple cyst. No hydroureteronephrosis or nephroureterolithiasis is  identified.      PELVIS:      BLADDER:  The urinary bladder appears normal without abnormal wall thickening.      REPRODUCTIVE ORGANS:  No pelvic masses.      BOWEL:  The stomach is unremarkable.   The small and large bowel are normal  in caliber and demonstrate no wall thickening. Small duodenal  diverticulum. Normal appendix.      VESSELS:  There is no aneurysmal dilatation of the abdominal aorta. The IVC  appears normal. Mild atherosclerotic calcification.      PERITONEUM/RETROPERITONEUM/LYMPH NODES:  No ascites or free air, no fluid collection.  No abdominopelvic  lymphadenopathy is present.      BONES AND ABDOMINAL WALL:  No suspicious osseous lesions are identified. Degenerative discogenic  disease is noted in the lower thoracic and lumbar spine.  Partially  imaged fat containing small bilateral inguinal hernias.      Impression: 1.  Partially imaged small fat containing bilateral inguinal hernias.  2. Hepatosplenomegaly and diffuse hepatic steatosis.          Signed by: River Dela Cruz 4/18/2024 8:51 AM  Dictation workstation:   [id number]         /89 (BP Location: Right arm, Patient Position: Sitting, BP Cuff Size: Large adult)   Pulse 66   Temp 35.6 °C (96.1 °F) (Temporal)   Resp 16   SpO2 98%      Physical Exam  Abdominal:      Comments: Obese    I I do not appreciate a  hernia              Assessment/    No clinical hernia    Possible small hernia by CT      Plan/    I think the chance of him having incarceration event related to the CT findings is exceedingly low.    On physical exam I cannot confirm a hernia    Given his weight I do not recommend hernia repair.

## 2024-07-29 ENCOUNTER — APPOINTMENT (OUTPATIENT)
Dept: PRIMARY CARE | Facility: CLINIC | Age: 54
End: 2024-07-29
Payer: COMMERCIAL

## 2024-07-29 ENCOUNTER — LAB (OUTPATIENT)
Dept: LAB | Facility: LAB | Age: 54
End: 2024-07-29
Payer: COMMERCIAL

## 2024-07-29 VITALS
HEART RATE: 73 BPM | WEIGHT: 315 LBS | RESPIRATION RATE: 18 BRPM | TEMPERATURE: 96.9 F | BODY MASS INDEX: 40.43 KG/M2 | HEIGHT: 74 IN | DIASTOLIC BLOOD PRESSURE: 84 MMHG | SYSTOLIC BLOOD PRESSURE: 128 MMHG

## 2024-07-29 DIAGNOSIS — E11.9 TYPE 2 DIABETES MELLITUS WITHOUT COMPLICATION, UNSPECIFIED WHETHER LONG TERM INSULIN USE (MULTI): ICD-10-CM

## 2024-07-29 DIAGNOSIS — I10 ESSENTIAL HYPERTENSION: ICD-10-CM

## 2024-07-29 DIAGNOSIS — Z12.5 PROSTATE CANCER SCREENING: ICD-10-CM

## 2024-07-29 DIAGNOSIS — E11.9 TYPE 2 DIABETES MELLITUS WITHOUT COMPLICATION, WITHOUT LONG-TERM CURRENT USE OF INSULIN (MULTI): Primary | ICD-10-CM

## 2024-07-29 DIAGNOSIS — E78.5 DYSLIPIDEMIA: ICD-10-CM

## 2024-07-29 DIAGNOSIS — E11.9 TYPE 2 DIABETES MELLITUS WITHOUT COMPLICATION, WITHOUT LONG-TERM CURRENT USE OF INSULIN (MULTI): ICD-10-CM

## 2024-07-29 LAB
ALBUMIN SERPL BCP-MCNC: 4.5 G/DL (ref 3.4–5)
ALP SERPL-CCNC: 78 U/L (ref 33–120)
ALT SERPL W P-5'-P-CCNC: 22 U/L (ref 10–52)
ANION GAP SERPL CALC-SCNC: 13 MMOL/L (ref 10–20)
AST SERPL W P-5'-P-CCNC: 17 U/L (ref 9–39)
BASOPHILS # BLD AUTO: 0.08 X10*3/UL (ref 0–0.1)
BASOPHILS NFR BLD AUTO: 1 %
BILIRUB SERPL-MCNC: 1.3 MG/DL (ref 0–1.2)
BUN SERPL-MCNC: 21 MG/DL (ref 6–23)
CALCIUM SERPL-MCNC: 9.2 MG/DL (ref 8.6–10.3)
CHLORIDE SERPL-SCNC: 102 MMOL/L (ref 98–107)
CHOLEST SERPL-MCNC: 146 MG/DL (ref 0–199)
CHOLESTEROL/HDL RATIO: 6
CO2 SERPL-SCNC: 28 MMOL/L (ref 21–32)
CREAT SERPL-MCNC: 0.92 MG/DL (ref 0.5–1.3)
CREAT UR-MCNC: 59.4 MG/DL (ref 20–370)
EGFRCR SERPLBLD CKD-EPI 2021: >90 ML/MIN/1.73M*2
EOSINOPHIL # BLD AUTO: 0.33 X10*3/UL (ref 0–0.7)
EOSINOPHIL NFR BLD AUTO: 3.9 %
ERYTHROCYTE [DISTWIDTH] IN BLOOD BY AUTOMATED COUNT: 13.9 % (ref 11.5–14.5)
GLUCOSE SERPL-MCNC: 264 MG/DL (ref 74–99)
HCT VFR BLD AUTO: 47.8 % (ref 41–52)
HDLC SERPL-MCNC: 24.4 MG/DL
HGB BLD-MCNC: 15.7 G/DL (ref 13.5–17.5)
IMM GRANULOCYTES # BLD AUTO: 0.13 X10*3/UL (ref 0–0.7)
IMM GRANULOCYTES NFR BLD AUTO: 1.5 % (ref 0–0.9)
LDLC SERPL CALC-MCNC: ABNORMAL MG/DL
LYMPHOCYTES # BLD AUTO: 2.42 X10*3/UL (ref 1.2–4.8)
LYMPHOCYTES NFR BLD AUTO: 28.8 %
MCH RBC QN AUTO: 29.1 PG (ref 26–34)
MCHC RBC AUTO-ENTMCNC: 32.8 G/DL (ref 32–36)
MCV RBC AUTO: 89 FL (ref 80–100)
MICROALBUMIN UR-MCNC: <7 MG/L
MICROALBUMIN/CREAT UR: NORMAL MG/G{CREAT}
MONOCYTES # BLD AUTO: 0.78 X10*3/UL (ref 0.1–1)
MONOCYTES NFR BLD AUTO: 9.3 %
NEUTROPHILS # BLD AUTO: 4.65 X10*3/UL (ref 1.2–7.7)
NEUTROPHILS NFR BLD AUTO: 55.5 %
NON HDL CHOLESTEROL: 122 MG/DL (ref 0–149)
NRBC BLD-RTO: 0 /100 WBCS (ref 0–0)
PLATELET # BLD AUTO: 185 X10*3/UL (ref 150–450)
POC HEMOGLOBIN A1C: 8.9 % (ref 4.2–6.5)
POTASSIUM SERPL-SCNC: 3.8 MMOL/L (ref 3.5–5.3)
PROT SERPL-MCNC: 7 G/DL (ref 6.4–8.2)
PSA SERPL-MCNC: 0.06 NG/ML
RBC # BLD AUTO: 5.39 X10*6/UL (ref 4.5–5.9)
SODIUM SERPL-SCNC: 139 MMOL/L (ref 136–145)
T4 FREE SERPL-MCNC: 1.04 NG/DL (ref 0.61–1.12)
TRIGL SERPL-MCNC: 594 MG/DL (ref 0–149)
TSH SERPL-ACNC: 2.88 MIU/L (ref 0.44–3.98)
VLDL: ABNORMAL
WBC # BLD AUTO: 8.4 X10*3/UL (ref 4.4–11.3)

## 2024-07-29 PROCEDURE — 82043 UR ALBUMIN QUANTITATIVE: CPT

## 2024-07-29 PROCEDURE — 82570 ASSAY OF URINE CREATININE: CPT

## 2024-07-29 PROCEDURE — 80053 COMPREHEN METABOLIC PANEL: CPT

## 2024-07-29 PROCEDURE — 99213 OFFICE O/P EST LOW 20 MIN: CPT | Performed by: FAMILY MEDICINE

## 2024-07-29 PROCEDURE — 84443 ASSAY THYROID STIM HORMONE: CPT

## 2024-07-29 PROCEDURE — 36415 COLL VENOUS BLD VENIPUNCTURE: CPT

## 2024-07-29 PROCEDURE — 85025 COMPLETE CBC W/AUTO DIFF WBC: CPT

## 2024-07-29 PROCEDURE — 83036 HEMOGLOBIN GLYCOSYLATED A1C: CPT | Performed by: FAMILY MEDICINE

## 2024-07-29 PROCEDURE — 80061 LIPID PANEL: CPT

## 2024-07-29 PROCEDURE — 84153 ASSAY OF PSA TOTAL: CPT

## 2024-07-29 PROCEDURE — 84439 ASSAY OF FREE THYROXINE: CPT

## 2024-07-29 ASSESSMENT — ENCOUNTER SYMPTOMS
FATIGUE: 0
SHORTNESS OF BREATH: 0
DIARRHEA: 0
NUMBNESS: 0
ARTHRALGIAS: 0
APPETITE CHANGE: 0
CHEST TIGHTNESS: 0
MYALGIAS: 0
WEAKNESS: 0
POLYDIPSIA: 0
HEADACHES: 0
POLYPHAGIA: 0
NAUSEA: 0
VOMITING: 0
DIZZINESS: 0
FREQUENCY: 0

## 2024-07-29 ASSESSMENT — PATIENT HEALTH QUESTIONNAIRE - PHQ9
SUM OF ALL RESPONSES TO PHQ9 QUESTIONS 1 AND 2: 0
2. FEELING DOWN, DEPRESSED OR HOPELESS: NOT AT ALL
1. LITTLE INTEREST OR PLEASURE IN DOING THINGS: NOT AT ALL

## 2024-07-29 NOTE — PROGRESS NOTES
"Subjective   Patient ID: Al Ames is a 53 y.o. male who presents for Med Refill (6 month med check DM and A1C. ).    HPI     Review of Systems   Constitutional:  Negative for appetite change and fatigue.   Eyes:  Negative for visual disturbance.   Respiratory:  Negative for chest tightness and shortness of breath.    Cardiovascular:  Negative for chest pain and leg swelling.   Gastrointestinal:  Negative for diarrhea, nausea and vomiting.   Endocrine: Negative for polydipsia, polyphagia and polyuria.   Genitourinary:  Negative for frequency and urgency.   Musculoskeletal:  Negative for arthralgias and myalgias.   Neurological:  Negative for dizziness, syncope, weakness, numbness and headaches.       Objective   /84   Pulse 73   Temp 36.1 °C (96.9 °F)   Resp 18   Ht 1.88 m (6' 2\")   Wt (!) 151 kg (332 lb)   BMI 42.63 kg/m²     Physical Exam  Constitutional:       Appearance: Normal appearance. He is obese.   HENT:      Head: Normocephalic.      Right Ear: Tympanic membrane, ear canal and external ear normal.      Left Ear: Tympanic membrane, ear canal and external ear normal.      Nose: Nose normal.      Mouth/Throat:      Mouth: Mucous membranes are moist.      Pharynx: Oropharynx is clear.   Eyes:      Conjunctiva/sclera: Conjunctivae normal.   Cardiovascular:      Rate and Rhythm: Normal rate and regular rhythm.   Pulmonary:      Effort: Pulmonary effort is normal.      Breath sounds: Normal breath sounds.   Musculoskeletal:         General: Normal range of motion.      Cervical back: Neck supple.   Skin:     General: Skin is warm and dry.   Neurological:      General: No focal deficit present.      Mental Status: He is alert and oriented to person, place, and time.   Psychiatric:         Mood and Affect: Mood normal.         Assessment/Plan   Problem List Items Addressed This Visit             ICD-10-CM    Dyslipidemia E78.5    Relevant Orders    Follow Up In [facility] - PCP    CBC " and Auto Differential (Completed)    Comprehensive Metabolic Panel (Completed)    Lipid Panel (Completed)    Prostate Specific Antigen (Completed)    Thyroid Stimulating Hormone (Completed)    T4, free (Completed)    Albumin-Creatinine Ratio, Urine Random (Completed)    Essential hypertension I10    Relevant Orders    Follow Up In [facility] - PCP    CBC and Auto Differential (Completed)    Comprehensive Metabolic Panel (Completed)    Lipid Panel (Completed)    Prostate Specific Antigen (Completed)    Thyroid Stimulating Hormone (Completed)    T4, free (Completed)    Albumin-Creatinine Ratio, Urine Random (Completed)     Other Visit Diagnoses         Codes    Type 2 diabetes mellitus without complication, without long-term current use of insulin (Multi)    -  Primary E11.9    Relevant Medications    dulaglutide 3 mg/0.5 mL pen injector (Start on 8/4/2024)    Other Relevant Orders    Follow Up In [facility] - PCP    POCT glycosylated hemoglobin (Hb A1C) manually resulted (Completed)    CBC and Auto Differential (Completed)    Comprehensive Metabolic Panel (Completed)    Lipid Panel (Completed)    Prostate Specific Antigen (Completed)    Thyroid Stimulating Hormone (Completed)    T4, free (Completed)    Albumin-Creatinine Ratio, Urine Random (Completed)    Prostate cancer screening     Z12.5    Relevant Orders    Follow Up In [facility] - PCP    CBC and Auto Differential (Completed)    Comprehensive Metabolic Panel (Completed)    Lipid Panel (Completed)    Prostate Specific Antigen (Completed)    Thyroid Stimulating Hormone (Completed)    T4, free (Completed)    Albumin-Creatinine Ratio, Urine Random (Completed)    Type 2 diabetes mellitus without complication, unspecified whether long term insulin use (Multi)     E11.9

## 2024-08-19 DIAGNOSIS — I10 ESSENTIAL HYPERTENSION: ICD-10-CM

## 2024-08-21 RX ORDER — AMLODIPINE BESYLATE 10 MG/1
10 TABLET ORAL DAILY
Qty: 90 TABLET | Refills: 3 | Status: SHIPPED | OUTPATIENT
Start: 2024-08-21

## 2025-01-27 ENCOUNTER — PATIENT MESSAGE (OUTPATIENT)
Dept: PRIMARY CARE | Facility: CLINIC | Age: 55
End: 2025-01-27

## 2025-01-27 ENCOUNTER — APPOINTMENT (OUTPATIENT)
Dept: PRIMARY CARE | Facility: CLINIC | Age: 55
End: 2025-01-27
Payer: COMMERCIAL

## 2025-01-27 VITALS
BODY MASS INDEX: 40.43 KG/M2 | RESPIRATION RATE: 20 BRPM | TEMPERATURE: 97.7 F | HEIGHT: 74 IN | WEIGHT: 315 LBS | HEART RATE: 84 BPM | SYSTOLIC BLOOD PRESSURE: 132 MMHG | DIASTOLIC BLOOD PRESSURE: 80 MMHG

## 2025-01-27 DIAGNOSIS — R25.2 LEG CRAMPS: Primary | ICD-10-CM

## 2025-01-27 DIAGNOSIS — I10 ESSENTIAL HYPERTENSION: ICD-10-CM

## 2025-01-27 DIAGNOSIS — R53.83 FATIGUE, UNSPECIFIED TYPE: ICD-10-CM

## 2025-01-27 DIAGNOSIS — E11.9 TYPE 2 DIABETES MELLITUS WITHOUT COMPLICATION, WITHOUT LONG-TERM CURRENT USE OF INSULIN (MULTI): Primary | ICD-10-CM

## 2025-01-27 DIAGNOSIS — E78.5 DYSLIPIDEMIA: ICD-10-CM

## 2025-01-27 DIAGNOSIS — Z12.5 PROSTATE CANCER SCREENING: ICD-10-CM

## 2025-01-27 LAB — POC HEMOGLOBIN A1C: 8.5 % (ref 4.2–6.5)

## 2025-01-27 PROCEDURE — 83036 HEMOGLOBIN GLYCOSYLATED A1C: CPT | Performed by: FAMILY MEDICINE

## 2025-01-27 PROCEDURE — 99213 OFFICE O/P EST LOW 20 MIN: CPT | Performed by: FAMILY MEDICINE

## 2025-01-27 RX ORDER — EZETIMIBE 10 MG/1
10 TABLET ORAL DAILY
Qty: 30 TABLET | Refills: 5 | Status: SHIPPED | OUTPATIENT
Start: 2025-01-27 | End: 2025-07-26

## 2025-01-27 ASSESSMENT — ENCOUNTER SYMPTOMS
APPETITE CHANGE: 0
HEADACHES: 0
CHEST TIGHTNESS: 0
ARTHRALGIAS: 0
DIZZINESS: 0
NAUSEA: 0
POLYDIPSIA: 0
NUMBNESS: 0
FATIGUE: 0
VOMITING: 0
WEAKNESS: 0
DIARRHEA: 0
POLYPHAGIA: 0
SHORTNESS OF BREATH: 0
MYALGIAS: 0
FREQUENCY: 0

## 2025-01-27 NOTE — PROGRESS NOTES
"Subjective   Patient ID: Al Ames is a 54 y.o. male who presents for Follow-up (6m follow up/DM follow up. ), Toe Injury (Pt dropped a brake caliper on left big toe about 4 months ago. He had a blood blister under the nail and he ended up losing the nail. ), and Mass (Rt index finger has a lump on the side. Its swells up and itches at times. Its been there for a few months.  ).    HPI     Review of Systems   Constitutional:  Negative for appetite change and fatigue.   Eyes:  Negative for visual disturbance.   Respiratory:  Negative for chest tightness and shortness of breath.    Cardiovascular:  Negative for chest pain and leg swelling.   Gastrointestinal:  Negative for diarrhea, nausea and vomiting.   Endocrine: Negative for polydipsia, polyphagia and polyuria.   Genitourinary:  Negative for frequency and urgency.   Musculoskeletal:  Negative for arthralgias and myalgias.   Neurological:  Negative for dizziness, syncope, weakness, numbness and headaches.       Objective   /80   Pulse 84   Temp 36.5 °C (97.7 °F)   Resp 20   Ht 1.88 m (6' 2\")   Wt 149 kg (328 lb)   BMI 42.11 kg/m²     Physical Exam  Constitutional:       Appearance: Normal appearance.   HENT:      Head: Normocephalic.   Eyes:      Conjunctiva/sclera: Conjunctivae normal.   Cardiovascular:      Rate and Rhythm: Normal rate and regular rhythm.      Heart sounds: Normal heart sounds.   Pulmonary:      Effort: Pulmonary effort is normal.      Breath sounds: Normal breath sounds.   Musculoskeletal:      Right hand: Swelling present.        Arms:       Cervical back: Neck supple.        Legs:       Comments: Tonail injury healing  Cyst on side if indexfinger   Skin:     General: Skin is warm and dry.   Neurological:      Mental Status: He is alert.         Assessment/Plan   Problem List Items Addressed This Visit             ICD-10-CM    Dyslipidemia E78.5    Relevant Medications    ezetimibe (Zetia) 10 mg tablet    Other Relevant Orders "    CBC and Auto Differential    Comprehensive Metabolic Panel    Thyroid Stimulating Hormone    T4, free    Testosterone, total and free    Essential hypertension I10    Relevant Medications    ezetimibe (Zetia) 10 mg tablet    Other Relevant Orders    CBC and Auto Differential    Comprehensive Metabolic Panel    Thyroid Stimulating Hormone    T4, free    Testosterone, total and free     Other Visit Diagnoses         Codes    Type 2 diabetes mellitus without complication, without long-term current use of insulin (Multi)    -  Primary E11.9    Relevant Medications    ezetimibe (Zetia) 10 mg tablet    Other Relevant Orders    Referral to Clinical Pharmacy    POCT glycosylated hemoglobin (Hb A1C) manually resulted (Completed)    CBC and Auto Differential    Comprehensive Metabolic Panel    Thyroid Stimulating Hormone    T4, free    Testosterone, total and free    Prostate cancer screening     Z12.5    Relevant Medications    ezetimibe (Zetia) 10 mg tablet    Other Relevant Orders    CBC and Auto Differential    Comprehensive Metabolic Panel    Thyroid Stimulating Hormone    T4, free    Testosterone, total and free    Fatigue, unspecified type     R53.83    Relevant Medications    ezetimibe (Zetia) 10 mg tablet    Other Relevant Orders    CBC and Auto Differential    Comprehensive Metabolic Panel    Thyroid Stimulating Hormone    T4, free    Testosterone, total and free

## 2025-01-28 RX ORDER — CYCLOBENZAPRINE HCL 10 MG
10 TABLET ORAL NIGHTLY PRN
Qty: 30 TABLET | Refills: 3 | Status: SHIPPED | OUTPATIENT
Start: 2025-01-28 | End: 2025-05-28

## 2025-01-30 LAB
ALBUMIN SERPL-MCNC: 4.5 G/DL (ref 3.6–5.1)
ALP SERPL-CCNC: 77 U/L (ref 35–144)
ALT SERPL-CCNC: 28 U/L (ref 9–46)
ANION GAP SERPL CALCULATED.4IONS-SCNC: 11 MMOL/L (CALC) (ref 7–17)
AST SERPL-CCNC: 18 U/L (ref 10–35)
BASOPHILS # BLD AUTO: 66 CELLS/UL (ref 0–200)
BASOPHILS NFR BLD AUTO: 0.8 %
BILIRUB SERPL-MCNC: 1.5 MG/DL (ref 0.2–1.2)
BUN SERPL-MCNC: 16 MG/DL (ref 7–25)
CALCIUM SERPL-MCNC: 9.4 MG/DL (ref 8.6–10.3)
CHLORIDE SERPL-SCNC: 101 MMOL/L (ref 98–110)
CO2 SERPL-SCNC: 30 MMOL/L (ref 20–32)
CREAT SERPL-MCNC: 0.8 MG/DL (ref 0.7–1.3)
EGFRCR SERPLBLD CKD-EPI 2021: 105 ML/MIN/1.73M2
EOSINOPHIL # BLD AUTO: 216 CELLS/UL (ref 15–500)
EOSINOPHIL NFR BLD AUTO: 2.6 %
ERYTHROCYTE [DISTWIDTH] IN BLOOD BY AUTOMATED COUNT: 13.8 % (ref 11–15)
GLUCOSE SERPL-MCNC: 197 MG/DL (ref 65–99)
HCT VFR BLD AUTO: 49.8 % (ref 38.5–50)
HGB BLD-MCNC: 16.2 G/DL (ref 13.2–17.1)
LYMPHOCYTES # BLD AUTO: 2150 CELLS/UL (ref 850–3900)
LYMPHOCYTES NFR BLD AUTO: 25.9 %
MCH RBC QN AUTO: 28.7 PG (ref 27–33)
MCHC RBC AUTO-ENTMCNC: 32.5 G/DL (ref 32–36)
MCV RBC AUTO: 88.1 FL (ref 80–100)
MONOCYTES # BLD AUTO: 664 CELLS/UL (ref 200–950)
MONOCYTES NFR BLD AUTO: 8 %
NEUTROPHILS # BLD AUTO: 5204 CELLS/UL (ref 1500–7800)
NEUTROPHILS NFR BLD AUTO: 62.7 %
PLATELET # BLD AUTO: 189 THOUSAND/UL (ref 140–400)
PMV BLD REES-ECKER: 12.5 FL (ref 7.5–12.5)
POTASSIUM SERPL-SCNC: 4 MMOL/L (ref 3.5–5.3)
PROT SERPL-MCNC: 7 G/DL (ref 6.1–8.1)
RBC # BLD AUTO: 5.65 MILLION/UL (ref 4.2–5.8)
SODIUM SERPL-SCNC: 142 MMOL/L (ref 135–146)
T4 FREE SERPL-MCNC: 1.6 NG/DL (ref 0.8–1.8)
TESTOST FREE SERPL-MCNC: 50.7 PG/ML (ref 35–155)
TESTOST SERPL-MCNC: 213 NG/DL (ref 250–1100)
TSH SERPL-ACNC: 2.02 MIU/L (ref 0.4–4.5)
WBC # BLD AUTO: 8.3 THOUSAND/UL (ref 3.8–10.8)

## 2025-01-31 NOTE — RESULT ENCOUNTER NOTE
His total testosterone was slightly decreased but his free testosterone is in range we will keep an eye on this if his free drops blow normal we will consider testosterone replacement

## 2025-04-01 DIAGNOSIS — E11.9 TYPE 2 DIABETES MELLITUS WITHOUT COMPLICATION, WITHOUT LONG-TERM CURRENT USE OF INSULIN: ICD-10-CM

## 2025-04-01 NOTE — TELEPHONE ENCOUNTER
Patient requests prescription below    Last Office Visit: 1/27/2025   Next Office Visit: 7/28/2025     Requested Prescriptions     Pending Prescriptions Disp Refills    dulaglutide (Trulicity) 3 mg/0.5 mL injection 6 mL 3     Sig: Inject 3 mg under the skin 1 (one) time per week.

## 2025-04-08 ENCOUNTER — OFFICE VISIT (OUTPATIENT)
Dept: URGENT CARE | Age: 55
End: 2025-04-08
Payer: COMMERCIAL

## 2025-04-08 VITALS
RESPIRATION RATE: 20 BRPM | TEMPERATURE: 97.3 F | BODY MASS INDEX: 40.44 KG/M2 | WEIGHT: 315 LBS | SYSTOLIC BLOOD PRESSURE: 152 MMHG | DIASTOLIC BLOOD PRESSURE: 88 MMHG | HEART RATE: 82 BPM | OXYGEN SATURATION: 96 %

## 2025-04-08 DIAGNOSIS — J20.9 ACUTE BRONCHITIS, UNSPECIFIED ORGANISM: Primary | ICD-10-CM

## 2025-04-08 PROCEDURE — 1036F TOBACCO NON-USER: CPT | Performed by: NURSE PRACTITIONER

## 2025-04-08 PROCEDURE — 99213 OFFICE O/P EST LOW 20 MIN: CPT | Performed by: NURSE PRACTITIONER

## 2025-04-08 PROCEDURE — 3077F SYST BP >= 140 MM HG: CPT | Performed by: NURSE PRACTITIONER

## 2025-04-08 PROCEDURE — 4010F ACE/ARB THERAPY RXD/TAKEN: CPT | Performed by: NURSE PRACTITIONER

## 2025-04-08 PROCEDURE — 3052F HG A1C>EQUAL 8.0%<EQUAL 9.0%: CPT | Performed by: NURSE PRACTITIONER

## 2025-04-08 PROCEDURE — 3079F DIAST BP 80-89 MM HG: CPT | Performed by: NURSE PRACTITIONER

## 2025-04-08 PROCEDURE — 94640 AIRWAY INHALATION TREATMENT: CPT | Performed by: NURSE PRACTITIONER

## 2025-04-08 RX ORDER — DOXYCYCLINE 100 MG/1
100 CAPSULE ORAL 2 TIMES DAILY
Qty: 20 CAPSULE | Refills: 0 | Status: SHIPPED | OUTPATIENT
Start: 2025-04-08 | End: 2025-04-18

## 2025-04-08 RX ORDER — IPRATROPIUM BROMIDE AND ALBUTEROL SULFATE 2.5; .5 MG/3ML; MG/3ML
3 SOLUTION RESPIRATORY (INHALATION) ONCE
Status: COMPLETED | OUTPATIENT
Start: 2025-04-08 | End: 2025-04-08

## 2025-04-08 RX ORDER — METHYLPREDNISOLONE 4 MG/1
TABLET ORAL
Qty: 21 TABLET | Refills: 0 | Status: SHIPPED | OUTPATIENT
Start: 2025-04-08

## 2025-04-08 RX ORDER — BENZONATATE 200 MG/1
200 CAPSULE ORAL 3 TIMES DAILY PRN
Qty: 30 CAPSULE | Refills: 0 | Status: SHIPPED | OUTPATIENT
Start: 2025-04-08 | End: 2025-04-18

## 2025-04-08 RX ADMIN — IPRATROPIUM BROMIDE AND ALBUTEROL SULFATE 3 ML: 2.5; .5 SOLUTION RESPIRATORY (INHALATION) at 09:42

## 2025-04-08 NOTE — PATIENT INSTRUCTIONS
While on steroids, it is important to manage blood sugar levels. Steroids can cause hyperglycemia, so close monitoring and diabetes management are crucial during treatment.  Please let your PCP know that you are on steroids.     Supportive care - encourage clear fluids ( water, Pedialyte, ) , chicken broth/soup and warm fluids can be soothing as well.  Rest, adjust room temperature and humidity.  Use saline spray/drops as needed.  Tylenol or Motrin if needed for fever.   Follow up with PCP if you are not feeling any better.

## 2025-04-08 NOTE — PROGRESS NOTES
Subjective   Patient ID: Al Ames is a 54 y.o. male. They present today with a chief complaint of Cough and URI.    History of Present Illness  Patient presents with a complaint of a cough for approximately one week. States he gets bronchitis frequently.  Complaints of congestion and runny nose. States it started as a cold. He does not have COPD but he is an ex smoker.     Past Medical History  Allergies as of 04/08/2025 - Reviewed 04/08/2025   Allergen Reaction Noted    Amoxicillin Unknown 04/18/2023    Other Other and Unknown 04/18/2023    Statins-hmg-coa reductase inhibitors Unknown 07/24/2023       (Not in a hospital admission)       Past Medical History:   Diagnosis Date    Actinic keratosis     Actinic keratoses    Acute bronchitis 05/08/2023    Acute sinusitis 05/08/2023    ADHD (attention deficit hyperactivity disorder)     Allergic     Ankle pain 04/18/2023    Arthritis     Asthma     Chronic sinusitis, unspecified     Sinobronchitis    Chronic sphenoidal sinusitis 04/18/2023    Conjunctival hemorrhage, left eye     Traumatic subconjunctival hemorrhage of left eye    Cough 05/08/2023    COVID-19 04/18/2023    Deviated nasal septum 04/18/2023    Encounter for screening for malignant neoplasm of skin     Skin cancer screening    Fatty (change of) liver, not elsewhere classified     Fatty liver disease, nonalcoholic    Hand pain 05/08/2023    Hypertension     Induration penis plastica     Peyronie's disease    Laceration without foreign body of other part of head, initial encounter     Face lacerations    Other general symptoms and signs     Flu-like symptoms    Personal history of diseases of the skin and subcutaneous tissue     History of solar lentigo    Personal history of other diseases of the female genital tract 11/18/2019    History of abnormal uterine bleeding    Personal history of other diseases of the respiratory system 07/22/2019    History of acute sinusitis    Personal history of other  diseases of the respiratory system     History of extrinsic asthma    Personal history of other diseases of the respiratory system     History of bronchitis    Personal history of other endocrine, nutritional and metabolic disease     History of hypothyroidism    Personal history of other infectious and parasitic diseases     History of scabies    Personal history of other infectious and parasitic diseases     History of viral gastroenteritis    Personal history of traumatic brain injury     History of concussion    Pure hyperglyceridemia     High triglycerides    Rotator cuff tear, left 05/08/2023    Type 2 diabetes mellitus without complications 08/14/2019    Type 2 diabetes mellitus without complication, without long-term current use of insulin    Unspecified otitis externa, unspecified ear     Otitis externa       Past Surgical History:   Procedure Laterality Date    CIRCUMCISION, PRIMARY      FRACTURE SURGERY      JOINT REPLACEMENT      OTHER SURGICAL HISTORY  06/04/2019    Ankle surgery    SINUS SURGERY      WISDOM TOOTH EXTRACTION          reports that he has quit smoking. His smoking use included cigarettes. He has been exposed to tobacco smoke. He has never used smokeless tobacco. He reports current alcohol use. He reports that he does not currently use drugs.    Review of Systems  Review of Systems     See HPI                          Objective    Vitals:    04/08/25 0931 04/08/25 0933   BP: 155/90 152/88   Pulse: 82    Resp: 20    Temp: 36.3 °C (97.3 °F)    SpO2: 96%    Weight: 143 kg (315 lb)      No LMP for male patient.    Physical Exam  CONSTITUTIONAL: The general appearance and condition of the patient were examined.  Level of distress, nutrition, external development abnormality, and general behavior were noted.  No abnormal findings.  Vital signs as documented.      ENT: External ears: left ear normal ; right ear normal. Bilateral ears have bulging TM's.  Normal external ear exam.  Bilateral  swelling and redness to nasal turbinate's.  Erythema with pebbling to throat.         CARDIOVASCULAR: The patient's heart examined for regular rate and rhythm and presence of murmurs. Note taken of any tachycardia, bradycardia or any irregular rhythm.  No abnormal findings.        RESPIRATORY/LUNGS: Chest examined for equal movement, bilaterally.  Lungs examined for equality of breath sounds. Presence or absence of rales noted bilaterally.  Examined for the presence of diffuse or scattered wheezes.  No abnormal findings.      Procedures    Point of Care Test & Imaging Results from this visit  No results found for this visit on 04/08/25.   Imaging  No results found.    Cardiology, Vascular, and Other Imaging  No other imaging results found for the past 2 days      Diagnostic study results (if any) were reviewed by DYLAN Beltran.    Assessment/Plan   Allergies, medications, history, and pertinent labs/EKGs/Imaging reviewed by DYLAN Beltran.     Medical Decision Making  Patient Instructions   While on steroids, it is important to manage blood sugar levels. Steroids can cause hyperglycemia, so close monitoring and diabetes management are crucial during treatment.  Please let your PCP know that you are on steroids.     Supportive care - encourage clear fluids ( water, Pedialyte, ) , chicken broth/soup and warm fluids can be soothing as well.  Rest, adjust room temperature and humidity.  Use saline spray/drops as needed.  Tylenol or Motrin if needed for fever.   Follow up with PCP if you are not feeling any better.    At time of discharge patient was clinically well-appearing and HDS for outpatient management. The patient and/or family was educated regarding diagnosis, supportive care, OTC and Rx medications. The patient and/or family was given the opportunity to ask questions prior to discharge.  They verbalized understanding of my discussion of the plans for treatment, expected course, indications  to return to  or seek further evaluation in ED, and the need for timely follow up as directed.   They were provided with a work/school excuse if requested.    Orders and Diagnoses  Diagnoses and all orders for this visit:  Acute bronchitis, unspecified organism  -     ipratropium-albuteroL (Duo-Neb) 0.5-2.5 mg/3 mL nebulizer solution 3 mL      Medical Admin Record      Patient disposition: Home    Electronically signed by DYLAN Beltran  9:38 AM

## 2025-05-23 ENCOUNTER — PATIENT MESSAGE (OUTPATIENT)
Dept: PRIMARY CARE | Facility: CLINIC | Age: 55
End: 2025-05-23
Payer: COMMERCIAL

## 2025-05-23 DIAGNOSIS — E11.9 TYPE 2 DIABETES MELLITUS WITHOUT COMPLICATION, WITHOUT LONG-TERM CURRENT USE OF INSULIN: Primary | ICD-10-CM

## 2025-05-27 RX ORDER — INSULIN GLARGINE 100 [IU]/ML
45 INJECTION, SOLUTION SUBCUTANEOUS NIGHTLY
Qty: 13.5 ML | Refills: 1 | Status: SHIPPED | OUTPATIENT
Start: 2025-05-27 | End: 2025-07-26

## 2025-05-29 DIAGNOSIS — E03.9 HYPOTHYROIDISM, ADULT: ICD-10-CM

## 2025-07-07 DIAGNOSIS — I10 ESSENTIAL HYPERTENSION: ICD-10-CM

## 2025-07-07 RX ORDER — METOPROLOL SUCCINATE 100 MG/1
TABLET, EXTENDED RELEASE ORAL
Qty: 270 TABLET | Refills: 3 | Status: SHIPPED | OUTPATIENT
Start: 2025-07-07

## 2025-07-07 NOTE — TELEPHONE ENCOUNTER
Requested Prescriptions     Pending Prescriptions Disp Refills    metoprolol succinate XL (Toprol-XL) 100 mg 24 hr tablet [Pharmacy Med Name: METOPROLOL SUCCINATE ER TABS 100MG] 270 tablet 3     Sig: TAKE 3 TABLETS ONCE DAILY AT BEDTIME (DO NOT CRUSH OR CHEW)

## 2025-07-09 ENCOUNTER — OFFICE VISIT (OUTPATIENT)
Dept: PRIMARY CARE | Facility: CLINIC | Age: 55
End: 2025-07-09
Payer: COMMERCIAL

## 2025-07-09 VITALS
RESPIRATION RATE: 20 BRPM | DIASTOLIC BLOOD PRESSURE: 83 MMHG | HEART RATE: 80 BPM | WEIGHT: 315 LBS | TEMPERATURE: 95.4 F | SYSTOLIC BLOOD PRESSURE: 135 MMHG | BODY MASS INDEX: 41.6 KG/M2 | OXYGEN SATURATION: 96 %

## 2025-07-09 DIAGNOSIS — L72.3 SEBACEOUS CYST OF FINGER: Primary | ICD-10-CM

## 2025-07-09 DIAGNOSIS — E11.9 TYPE 2 DIABETES MELLITUS WITHOUT COMPLICATION, WITHOUT LONG-TERM CURRENT USE OF INSULIN: ICD-10-CM

## 2025-07-09 DIAGNOSIS — E11.69 TYPE 2 DIABETES MELLITUS WITH OBESITY (MULTI): ICD-10-CM

## 2025-07-09 DIAGNOSIS — E66.9 TYPE 2 DIABETES MELLITUS WITH OBESITY (MULTI): ICD-10-CM

## 2025-07-09 PROCEDURE — 4010F ACE/ARB THERAPY RXD/TAKEN: CPT

## 2025-07-09 PROCEDURE — 3052F HG A1C>EQUAL 8.0%<EQUAL 9.0%: CPT

## 2025-07-09 PROCEDURE — 3075F SYST BP GE 130 - 139MM HG: CPT

## 2025-07-09 PROCEDURE — 99213 OFFICE O/P EST LOW 20 MIN: CPT

## 2025-07-09 PROCEDURE — 3079F DIAST BP 80-89 MM HG: CPT

## 2025-07-09 RX ORDER — SULFAMETHOXAZOLE AND TRIMETHOPRIM 800; 160 MG/1; MG/1
1 TABLET ORAL 2 TIMES DAILY
Qty: 20 TABLET | Refills: 0 | Status: CANCELLED | OUTPATIENT
Start: 2025-07-09 | End: 2025-07-19

## 2025-07-09 NOTE — PROGRESS NOTES
Subjective   Al Ames is a 54 y.o. male who presents for mass, finger   HPI    Presents with mass to R index finger   It has been present since November 2024. He remembers burning his finger around this time and thinks the mass has been present since, however he feels it has moved to opposite side. Now on medial aspect of finger  Not increasing in size   No drainage   Interfering with daily life - eating , holding motorcycle handles   Hx of MRSA to nose 15 -20 years ago   No other current similar lesions or since that time   No systemic symptoms     Patient has appointment with Dr. Pandey at end of the month for DM follow up, last A1C 8.5 January 2025     Current Outpatient Medications   Medication Instructions    albuterol (ProAir HFA) 90 mcg/actuation inhaler Inhale.    amLODIPine (NORVASC) 10 mg, oral, Daily    candesartan (ATACAND) 16 mg, oral, Daily    cyclobenzaprine (FLEXERIL) 10 mg, oral, Nightly PRN    dulaglutide (TRULICITY) 3 mg, subcutaneous, Once Weekly    ezetimibe (ZETIA) 10 mg, oral, Daily    fexofenadine (Allegra) 180 mg tablet 1 tablet, Daily    finasteride (PROPECIA) 1 mg, oral, Daily    fluticasone (Flonase) 50 mcg/actuation nasal spray Administer into affected nostril(s). As needed    glipiZIDE XL (Glucotrol XL) 10 mg 24 hr tablet TAKE 2 TABLETS ONCE DAILY, DO NOT CRUSH, CHEW OR SPLIT    hydroCHLOROthiazide (HYDRODIURIL) 25 mg, oral, Daily    Lantus Solostar U-100 Insulin 45 Units, subcutaneous, Nightly, Take as directed per insulin instructions.    levothyroxine (SYNTHROID, LEVOXYL) 300 mcg, oral, Daily    loratadine (Claritin) 10 mg tablet Take by mouth.    methylPREDNISolone (Medrol Dospak) 4 mg tablets Follow schedule on package instructions    metoprolol succinate XL (Toprol-XL) 100 mg 24 hr tablet TAKE 3 TABLETS ONCE DAILY AT BEDTIME (DO NOT CRUSH OR CHEW)    montelukast (SINGULAIR) 10 mg, oral, Every evening    naproxen sodium (Aleve) 220 mg tablet Every 12 hours    pen needle,  "diabetic (BD Ultra-Fine Mini Pen Needle) 31 gauge x 3/16\" needle Daily as directed    pioglitazone (ACTOS) 45 mg, oral, Daily    Semglee,insulin glarg-yfgn,Pen 100 unit/mL (3 mL) pen INJECT 45 UNITS DAILY    Xigduo XR 5-1,000 mg 2 tablets, oral, Daily      Objective   Visit Vitals  /83 (BP Location: Right arm, Patient Position: Sitting, BP Cuff Size: Large adult)   Pulse 80   Temp 35.2 °C (95.4 °F) (Temporal)   Resp 20   Wt 147 kg (324 lb)   SpO2 96%   BMI 41.60 kg/m²   Smoking Status Former   BSA 2.77 m²     Physical Exam  Vitals reviewed.   Constitutional:       General: He is not in acute distress.  Cardiovascular:      Rate and Rhythm: Normal rate and regular rhythm.   Pulmonary:      Effort: Pulmonary effort is normal.      Breath sounds: Normal breath sounds.   Skin:     Comments: R index finger with approximately 2 cm fluctuant mass more prominent on medial aspect, deep, no drainage, mild erythema and pain with palpation , does involve joint   Neurological:      Mental Status: He is alert.         Performed by: Eleanor Del Angel DO  Authorized by: Kalin Ramos DO      Procedure: Soft Tissue Ultrasound    Findings:  Fluid Collection: A FLUID COLLECTION was identified.        Impression:  Soft Tissue: The soft tissue ultrasound exam had ABNORMAL findings as specified.          Assessment/Plan   Assessment & Plan  Sebaceous cyst of finger  Patient presents with persistent swelling/ mass to R index finger which has been present for at least the last 6 months. It is painful and interfering with his daily activities. POCUS used to evaluate area which showed a well circumscribed circular fluid collection approximately 0.5 cm deep in finger. Referred to hand surgery for drainage given risk for infection     Orders:    Referral to Orthopedics and Sports Medicine; Future    Type 2 diabetes mellitus without complication, without long-term current use of insulin  Emphasized importance of compliance with diabetic " mediations to aid in wound healing  Last A1C 8.5 in January 2025              Eleanor Del Angel DO 07/09/25 2:49 PM

## 2025-07-10 NOTE — PROGRESS NOTES
I saw and evaluated the patient. I personally obtained the key and critical portions of the history and physical exam or was physically present for key and critical portions performed by the resident/fellow. I reviewed the resident/fellow's documentation and discussed the patient with the resident/fellow. I agree with the resident/fellow's medical decision making as documented in the note.    Kalin Ramos, DO

## 2025-07-17 ENCOUNTER — HOSPITAL ENCOUNTER (OUTPATIENT)
Dept: RADIOLOGY | Facility: HOSPITAL | Age: 55
Discharge: HOME | End: 2025-07-17
Payer: COMMERCIAL

## 2025-07-17 ENCOUNTER — APPOINTMENT (OUTPATIENT)
Dept: ORTHOPEDIC SURGERY | Facility: CLINIC | Age: 55
End: 2025-07-17
Payer: COMMERCIAL

## 2025-07-17 DIAGNOSIS — M79.644 FINGER PAIN, RIGHT: ICD-10-CM

## 2025-07-17 PROCEDURE — 73140 X-RAY EXAM OF FINGER(S): CPT | Mod: RT

## 2025-07-17 NOTE — PROGRESS NOTES
History of Present Illness:  Presents with mass on right index finger.  The mass has been present for 6 months and growing. The patient denies any inciting trauma. The pain is localized about the volar aspect of index pulp. It is described as moderate. The pain occurs intermittantly and is worse with activity. There have been no signs of infection such as purulent drainage or erythema. The patient presents due to persistent symptoms even with activity modification.    The patient's past medical history, family history, social history, and review of systems were reviewed. History is otherwise negative except as stated in the HPI.    Review of Systems   GENERAL: Negative for malaise, significant weight loss, fever  MUSCULOSKELETAL: see HPI  NEURO:  Negative    Physical Examination:  General: Alert and oriented to person, place, and time.  No acute distress and breathing comfortably: Pleasant and cooperative with examination.  HEENT: Head is normocephalic and atraumatic.  Neck: Supple, no visible swelling.  Cardiovascular: No palpable tachycardia  Lungs: No audible wheezing or labored breathing  Abdomen: Nondistended.  On musculoskeletal examination, the patient has full elbow and wrist range of motion. In regards to the hand, there is an obvious mass over the volar aspect of the index finger.There is tenderness to palpation about the mass. Finger range of motion is full with intact flexion and extension. The patient can make a full composite fist. All fingers are without triggering and are without pain over the A1 pulley.  Sensation and motor function are intact in the radial, ulnar, and median nerve distribution. There is no obvious thenar or intrinsic atrophy. The hand itself is warm and well perfused. The skin is intact throughout. The contralateral hand and wrist are normal to inspection, range of motion, stability, and strength.    Imaging:  AP, lateral, and oblique radiographs of the right index finger  demonstrate fullness of the volar aspect of the pulp    Assessment:  Patient with a right index finger mass.  Unclear etiology.  Recommend excision and pathology evaluation.      Plan:   RIGHT INDEX FINGER MASS Excision. I had a long discussion with the patient regarding the mass.  At this point, I discussed the risks/benefits/expected outcomes of the three treatment options: observation  vs surgical excision. In order to decrease symptoms and to minimize recurrence, the patient has elected to proceed with excision. The risks/benefits of surgery were reviewed. The risks include, but are not limited to, infection, bleeding, nerve/vessel injury, scar formation, recurrence, stiffness, and anaesthetic complications.  I have answered all questions regarding the surgery itself and the post-operative course. The patient consented to surgery and will be scheduled in the near future. The patient prefers to do this under local anaesthesia.        Wilma Bautista MD

## 2025-07-23 DIAGNOSIS — M79.644 FINGER PAIN, RIGHT: Primary | ICD-10-CM

## 2025-07-23 RX ORDER — IBUPROFEN 800 MG/1
800 TABLET, FILM COATED ORAL 3 TIMES DAILY
Qty: 15 TABLET | Refills: 0 | Status: SHIPPED | OUTPATIENT
Start: 2025-07-23 | End: 2025-07-28

## 2025-07-23 RX ORDER — TRAMADOL HYDROCHLORIDE 50 MG/1
50 TABLET, FILM COATED ORAL EVERY 8 HOURS PRN
Qty: 5 TABLET | Refills: 0 | Status: SHIPPED | OUTPATIENT
Start: 2025-07-23

## 2025-07-24 DIAGNOSIS — M79.644 FINGER PAIN, RIGHT: Primary | ICD-10-CM

## 2025-07-24 PROCEDURE — 26115 EXC HAND LES SC < 1.5 CM: CPT | Performed by: STUDENT IN AN ORGANIZED HEALTH CARE EDUCATION/TRAINING PROGRAM

## 2025-07-24 RX ORDER — SULFAMETHOXAZOLE AND TRIMETHOPRIM 800; 160 MG/1; MG/1
1 TABLET ORAL 2 TIMES DAILY
Qty: 14 TABLET | Refills: 0 | Status: SHIPPED | OUTPATIENT
Start: 2025-07-24 | End: 2025-07-31

## 2025-07-28 ENCOUNTER — APPOINTMENT (OUTPATIENT)
Dept: PRIMARY CARE | Facility: CLINIC | Age: 55
End: 2025-07-28
Payer: COMMERCIAL

## 2025-07-28 VITALS
HEART RATE: 77 BPM | OXYGEN SATURATION: 98 % | DIASTOLIC BLOOD PRESSURE: 80 MMHG | SYSTOLIC BLOOD PRESSURE: 124 MMHG | TEMPERATURE: 96.5 F | WEIGHT: 315 LBS | RESPIRATION RATE: 17 BRPM | HEIGHT: 74 IN | BODY MASS INDEX: 40.43 KG/M2

## 2025-07-28 DIAGNOSIS — J45.20 MILD INTERMITTENT ASTHMA WITHOUT COMPLICATION (HHS-HCC): ICD-10-CM

## 2025-07-28 DIAGNOSIS — E11.9 TYPE 2 DIABETES MELLITUS WITHOUT COMPLICATION, WITHOUT LONG-TERM CURRENT USE OF INSULIN: Primary | ICD-10-CM

## 2025-07-28 DIAGNOSIS — E66.9 TYPE 2 DIABETES MELLITUS WITH OBESITY (MULTI): ICD-10-CM

## 2025-07-28 DIAGNOSIS — E78.5 DYSLIPIDEMIA: ICD-10-CM

## 2025-07-28 DIAGNOSIS — E11.69 TYPE 2 DIABETES MELLITUS WITH OBESITY (MULTI): ICD-10-CM

## 2025-07-28 DIAGNOSIS — I10 ESSENTIAL HYPERTENSION: ICD-10-CM

## 2025-07-28 DIAGNOSIS — E03.9 HYPOTHYROIDISM, ADULT: ICD-10-CM

## 2025-07-28 LAB — POC HEMOGLOBIN A1C: 8.6 % (ref 4.2–6.5)

## 2025-07-28 PROCEDURE — 83036 HEMOGLOBIN GLYCOSYLATED A1C: CPT | Performed by: FAMILY MEDICINE

## 2025-07-28 PROCEDURE — 99213 OFFICE O/P EST LOW 20 MIN: CPT | Performed by: FAMILY MEDICINE

## 2025-07-28 RX ORDER — INSULIN GLARGINE 100 [IU]/ML
50 INJECTION, SOLUTION SUBCUTANEOUS NIGHTLY
Qty: 45 ML | Refills: 3 | Status: SHIPPED | OUTPATIENT
Start: 2025-07-28 | End: 2026-07-28

## 2025-07-28 RX ORDER — PEN NEEDLE, DIABETIC 30 GX3/16"
NEEDLE, DISPOSABLE MISCELLANEOUS
Qty: 100 EACH | Refills: 3 | Status: SHIPPED | OUTPATIENT
Start: 2025-07-28

## 2025-07-28 ASSESSMENT — ENCOUNTER SYMPTOMS
DIARRHEA: 0
SHORTNESS OF BREATH: 0
DIZZINESS: 0
VOMITING: 0
NUMBNESS: 0
POLYPHAGIA: 0
FATIGUE: 0
HEADACHES: 0
FREQUENCY: 0
POLYDIPSIA: 0
APPETITE CHANGE: 0
ARTHRALGIAS: 0
CHEST TIGHTNESS: 0
NAUSEA: 0
MYALGIAS: 0
WEAKNESS: 0

## 2025-07-28 NOTE — PROGRESS NOTES
"Subjective   Patient ID: Al Ames is a 54 y.o. male who presents for Med Refill (6 month med check DM and A1C. Pt states he has been doing well and has no new concerns.  ).    HPI     Review of Systems   Constitutional:  Negative for appetite change and fatigue.   Eyes:  Negative for visual disturbance.   Respiratory:  Negative for chest tightness and shortness of breath.    Cardiovascular:  Negative for chest pain and leg swelling.   Gastrointestinal:  Negative for diarrhea, nausea and vomiting.   Endocrine: Negative for polydipsia, polyphagia and polyuria.   Genitourinary:  Negative for frequency and urgency.   Musculoskeletal:  Negative for arthralgias and myalgias.   Neurological:  Negative for dizziness, syncope, weakness, numbness and headaches.       Objective   /80   Pulse 77   Temp 35.8 °C (96.5 °F)   Resp 17   Ht 1.88 m (6' 2\")   Wt 145 kg (320 lb)   SpO2 98%   BMI 41.09 kg/m²     Physical Exam  Constitutional:       Appearance: Normal appearance. He is obese.   HENT:      Head: Normocephalic.     Eyes:      Conjunctiva/sclera: Conjunctivae normal.       Cardiovascular:      Rate and Rhythm: Normal rate and regular rhythm.      Heart sounds: Normal heart sounds.   Pulmonary:      Effort: Pulmonary effort is normal.      Breath sounds: Normal breath sounds.   Abdominal:      Tenderness: There is no abdominal tenderness.     Musculoskeletal:      Cervical back: Neck supple.     Skin:     General: Skin is warm and dry.     Neurological:      Mental Status: He is alert.         Assessment/Plan   Problem List Items Addressed This Visit           ICD-10-CM    Type 2 diabetes mellitus with obesity (Multi) E11.69, E66.9    Relevant Medications    insulin glargine (Lantus Solostar U-100 Insulin) 100 unit/mL (3 mL) pen    Other Relevant Orders    CBC and Auto Differential    Comprehensive Metabolic Panel    Lipid Panel    Prostate Specific Antigen    Thyroid Stimulating Hormone    T4, free    " "Albumin-Creatinine Ratio, Urine Random    Dyslipidemia E78.5    Relevant Orders    CBC and Auto Differential    Comprehensive Metabolic Panel    Lipid Panel    Prostate Specific Antigen    Thyroid Stimulating Hormone    T4, free    Albumin-Creatinine Ratio, Urine Random    Essential hypertension I10    Relevant Orders    CBC and Auto Differential    Comprehensive Metabolic Panel    Lipid Panel    Prostate Specific Antigen    Thyroid Stimulating Hormone    T4, free    Albumin-Creatinine Ratio, Urine Random    Hypothyroidism, adult E03.9    Relevant Orders    CBC and Auto Differential    Comprehensive Metabolic Panel    Lipid Panel    Prostate Specific Antigen    Thyroid Stimulating Hormone    T4, free    Albumin-Creatinine Ratio, Urine Random    Mild intermittent asthma without complication ([facility]) J45.20    Dis reviewed stable         Relevant Orders    CBC and Auto Differential    Comprehensive Metabolic Panel    Lipid Panel    Prostate Specific Antigen    Thyroid Stimulating Hormone    T4, free    Albumin-Creatinine Ratio, Urine Random     Other Visit Diagnoses         Codes      Type 2 diabetes mellitus without complication, without long-term current use of insulin    -  Primary E11.9    Relevant Medications    insulin glargine (Lantus Solostar U-100 Insulin) 100 unit/mL (3 mL) pen    pen needle, diabetic (Pen Needle) 31 gauge x 3/16\" needle    Other Relevant Orders    POCT glycosylated hemoglobin (Hb A1C) manually resulted (Completed)    CBC and Auto Differential    Comprehensive Metabolic Panel    Lipid Panel    Prostate Specific Antigen    Thyroid Stimulating Hormone    T4, free    Albumin-Creatinine Ratio, Urine Random               "

## 2025-08-04 ENCOUNTER — OFFICE VISIT (OUTPATIENT)
Dept: ORTHOPEDIC SURGERY | Facility: CLINIC | Age: 55
End: 2025-08-04
Payer: COMMERCIAL

## 2025-08-04 DIAGNOSIS — E11.69 TYPE 2 DIABETES MELLITUS WITH OBESITY (MULTI): ICD-10-CM

## 2025-08-04 DIAGNOSIS — E66.9 TYPE 2 DIABETES MELLITUS WITH OBESITY (MULTI): ICD-10-CM

## 2025-08-04 DIAGNOSIS — L03.019 CELLULITIS OF FINGER, UNSPECIFIED LATERALITY: Primary | ICD-10-CM

## 2025-08-04 DIAGNOSIS — M79.644 FINGER PAIN, RIGHT: Primary | ICD-10-CM

## 2025-08-04 PROCEDURE — 99024 POSTOP FOLLOW-UP VISIT: CPT | Performed by: STUDENT IN AN ORGANIZED HEALTH CARE EDUCATION/TRAINING PROGRAM

## 2025-08-04 PROCEDURE — 3052F HG A1C>EQUAL 8.0%<EQUAL 9.0%: CPT | Performed by: STUDENT IN AN ORGANIZED HEALTH CARE EDUCATION/TRAINING PROGRAM

## 2025-08-04 PROCEDURE — 4010F ACE/ARB THERAPY RXD/TAKEN: CPT | Performed by: STUDENT IN AN ORGANIZED HEALTH CARE EDUCATION/TRAINING PROGRAM

## 2025-08-04 PROCEDURE — 99212 OFFICE O/P EST SF 10 MIN: CPT | Performed by: STUDENT IN AN ORGANIZED HEALTH CARE EDUCATION/TRAINING PROGRAM

## 2025-08-04 RX ORDER — DAPAGLIFLOZIN AND METFORMIN HYDROCHLORIDE 5; 1000 MG/1; MG/1
2 TABLET, FILM COATED, EXTENDED RELEASE ORAL DAILY
Qty: 180 TABLET | Refills: 3 | Status: SHIPPED | OUTPATIENT
Start: 2025-08-04

## 2025-08-04 NOTE — PROGRESS NOTES
2 weeks S/p right index finger incision and drainage 7/24/2025. Doing well. Denies numbness or tingling.  Completed antibiotic course.    Physical Examination:  The patient appears to be their stated age, is in no apparent distress, and is oriented x3. The patients mood and affect are appropriate. The patients gait is normal. The examination of the limb in question was performed in comparison to the contralateral limb.    Incision c/d/I  AIN, PIN, ulnar intact  SILT A/R/U/M  Hand wwp    Assessment:  2 weeks post op.  Sutures removed today.    Plan:   The patient will progress to weightbearing to tolerance with theupper extremity.  We reviewed range of motion recovery exercises to the digits and wrist, scar massage and desensitization techniques.  The patient will work on these on her own with home exercise program.  Follow up with our office on an as-needed basis.  We did offer a formal therapy referral.  They declined in favor of home exercise program.   The patient verbalized agreement and understanding of plan for care.  All questions were answered at today's visit.          Patrick Orta PA-C  Orthopedic Surgery

## 2025-08-07 ENCOUNTER — HOSPITAL ENCOUNTER (EMERGENCY)
Facility: HOSPITAL | Age: 55
Discharge: HOME | End: 2025-08-07
Payer: COMMERCIAL

## 2025-08-07 VITALS
HEART RATE: 82 BPM | WEIGHT: 315 LBS | OXYGEN SATURATION: 94 % | HEIGHT: 72 IN | BODY MASS INDEX: 42.66 KG/M2 | TEMPERATURE: 96.8 F | DIASTOLIC BLOOD PRESSURE: 88 MMHG | SYSTOLIC BLOOD PRESSURE: 149 MMHG | RESPIRATION RATE: 18 BRPM

## 2025-08-07 DIAGNOSIS — Z51.89 VISIT FOR WOUND CHECK: Primary | ICD-10-CM

## 2025-08-07 PROCEDURE — 2500000005 HC RX 250 GENERAL PHARMACY W/O HCPCS: Performed by: PHYSICIAN ASSISTANT

## 2025-08-07 PROCEDURE — 99283 EMERGENCY DEPT VISIT LOW MDM: CPT

## 2025-08-07 PROCEDURE — 99282 EMERGENCY DEPT VISIT SF MDM: CPT

## 2025-08-07 PROCEDURE — 99283 EMERGENCY DEPT VISIT LOW MDM: CPT | Performed by: PHYSICIAN ASSISTANT

## 2025-08-07 PROCEDURE — 2500000001 HC RX 250 WO HCPCS SELF ADMINISTERED DRUGS (ALT 637 FOR MEDICARE OP): Performed by: PHYSICIAN ASSISTANT

## 2025-08-07 RX ORDER — DOXYCYCLINE 100 MG/1
100 TABLET ORAL 2 TIMES DAILY
Qty: 14 TABLET | Refills: 0 | Status: SHIPPED | OUTPATIENT
Start: 2025-08-07 | End: 2025-08-14

## 2025-08-07 RX ORDER — DOXYCYCLINE 100 MG/1
100 CAPSULE ORAL ONCE
Status: COMPLETED | OUTPATIENT
Start: 2025-08-07 | End: 2025-08-07

## 2025-08-07 RX ORDER — BACITRACIN ZINC 500 UNIT/G
OINTMENT IN PACKET (EA) TOPICAL ONCE
Status: COMPLETED | OUTPATIENT
Start: 2025-08-07 | End: 2025-08-07

## 2025-08-07 RX ORDER — AZITHROMYCIN 250 MG/1
TABLET, FILM COATED ORAL
Qty: 11 TABLET | Refills: 0 | Status: SHIPPED | OUTPATIENT
Start: 2025-08-07 | End: 2025-08-17

## 2025-08-07 RX ORDER — BACITRACIN ZINC 500 UNIT/G
1 OINTMENT (GRAM) TOPICAL 2 TIMES DAILY
Qty: 14 G | Refills: 0 | Status: SHIPPED | OUTPATIENT
Start: 2025-08-07 | End: 2025-08-17

## 2025-08-07 RX ADMIN — DOXYCYCLINE HYCLATE 100 MG: 100 CAPSULE ORAL at 14:53

## 2025-08-07 RX ADMIN — BACITRACIN 1 APPLICATION: 500 OINTMENT TOPICAL at 14:53

## 2025-08-07 ASSESSMENT — LIFESTYLE VARIABLES
TOTAL SCORE: 0
HAVE YOU EVER FELT YOU SHOULD CUT DOWN ON YOUR DRINKING: NO
EVER FELT BAD OR GUILTY ABOUT YOUR DRINKING: NO
EVER HAD A DRINK FIRST THING IN THE MORNING TO STEADY YOUR NERVES TO GET RID OF A HANGOVER: NO
HAVE PEOPLE ANNOYED YOU BY CRITICIZING YOUR DRINKING: NO

## 2025-08-07 ASSESSMENT — PAIN - FUNCTIONAL ASSESSMENT: PAIN_FUNCTIONAL_ASSESSMENT: 0-10

## 2025-08-07 ASSESSMENT — PAIN SCALES - GENERAL: PAINLEVEL_OUTOF10: 0 - NO PAIN

## 2025-08-07 NOTE — TELEPHONE ENCOUNTER
Pt called and he was seen by Ortho and had his finger drained and a growth removed. He did have stiches and they were removed but when stiches were removed it broke open and started to bleed. They wrapped it up and he was told to unwrap it after a couple of days. When he unwrapped it it is wide open. He did wrap it up again but he thinks it is infected. He does have a call out to Ortho but has not heard back yet. Should he be taking an antibiotic?

## 2025-08-07 NOTE — ED PROVIDER NOTES
Emergency Department Encounter  [facility] EMERGENCY MEDICINE    Patient: Al Ames  MRN: [id number]  : 1970  Date of Evaluation: 2025  ED Provider: Krystyna Mix PA-C        History of Present Illness     This is a 54-year-old male with past medical history of hypertension, diabetes, hyperlipidemia who presents to the ED for a wound check to his right index finger.  Patient states that he had a mass removed by Dr. Raines on .  He states that he had the sutures removed in office on .  He states that there was a small active bleeding following suture removal.  A dressing was placed.  He took off the dressing for the first time today and noticed that it still seemed to be bleeding.  He is not on anticoagulation.  He does endorse some discomfort to the distal half of the finger.  Denies fevers or chills.  Denies any redness or swelling.  Denies any purulent drainage.  Was concerned about the wound so he came to the ED today to be evaluated.      History provided by:  Patient   used: No             Visit Vitals  /88 (BP Location: Left arm, Patient Position: Sitting)   Pulse 82   Temp 36 °C (96.8 °F) (Temporal)   Resp 18   Ht 1.829 m (6')   Wt 145 kg (320 lb)   SpO2 94%   BMI 43.40 kg/m²   Smoking Status Former   BSA 2.71 m²          Physical Exam       Triage vitals:  T 36 °C (96.8 °F)  HR 78  BP (!) 187/104  RR 18  O2 95 % None (Room air)    Physical Exam     Physical exam:   General: Vitals noted, no distress. Afebrile.   EENT:  Hearing grossly intact. Normal phonation. MMM. Airway patient. PERRL. EOMI.   Cardiac: Regular, rate, rhythm. Normal S1 and S2.  No murmurs, gallops, rubs.   Pulmonary: Good air exchange. Lungs clear bilaterally. No wheezes, rhonchi, rales. No accessory muscle use.   Extremities: No peripheral edema.  Full range of motion. Moves all extremities freely.  Mild tenderness palpation to the distal aspect of the right index  finger with small wound.  No surrounding erythema or excess warmth.  No purulent drainage.  No active bleeding at this time.  See photos of wound below.  Full range of motion of the digit.  No tenderness palpation over flexor tendon sheath.  Skin: No rash. Warm and Dry.   Neuro: No focal neurologic deficits. CN 2-12 grossly intact. Sensation equal bilaterally. No weakness.                 Results       Labs Reviewed - No data to display    No orders to display         Medical Decision Making & ED Course         ED Course & MDM     Medical Decision Making  This is a 54-year-old male with past medical history of hypertension, hyperlipidemia, and diabetes with recent excision of a mass to his right index finger who presents to the ED for wound check due to wound dehiscence.  Vital stable upon arrival to the ED.  On examination patient does have a small wound present to his right index finger with a small amount of dehiscence.  No purulent drainage.  I did contact patient's orthopedic provider, Dr. Raines, who did recommend starting patient on doxycycline and scheduled him for a follow-up next week.  Patient was given a dose of doxycycline here in the ED.  Wound was dressed with bacitracin.  He was written prescriptions for bacitracin as well as doxycycline to go home with.  Patient was given signs and symptoms of infection that he should return to the ED with.  He was agreeable to this plan.  He had no further questions at this time he was discharged from the ED in stable condition         Diagnoses as of 08/07/25 1617   Visit for wound check       The patient was discussed with the following consultants/services: Dr. Raines with orthopedics who recommended outpatient follow-up and the patient being started on doxycycline        Disposition   As a result of the work-up, the patient was discharged home.  he was informed of his diagnosis and instructed to come back with any concerns or worsening of condition.  he and  was agreeable to the plan as discussed above.  he was given the opportunity to ask questions.  All of the patient's questions were answered.    Procedures     Patient was seen independently    Procedures    Krystyna Mix PA-C  Emergency Medicine      Krystyna Mix PA-C  08/07/25 [id number]

## 2025-08-15 ENCOUNTER — OFFICE VISIT (OUTPATIENT)
Dept: ORTHOPEDIC SURGERY | Facility: CLINIC | Age: 55
End: 2025-08-15
Payer: COMMERCIAL

## 2025-08-15 ENCOUNTER — HOSPITAL ENCOUNTER (OUTPATIENT)
Dept: RADIOLOGY | Facility: CLINIC | Age: 55
Discharge: HOME | End: 2025-08-15
Payer: COMMERCIAL

## 2025-08-15 DIAGNOSIS — M79.644 FINGER PAIN, RIGHT: ICD-10-CM

## 2025-08-15 DIAGNOSIS — M79.644 FINGER PAIN, RIGHT: Primary | ICD-10-CM

## 2025-08-15 PROCEDURE — 73140 X-RAY EXAM OF FINGER(S): CPT | Mod: RT

## 2025-08-15 PROCEDURE — 99212 OFFICE O/P EST SF 10 MIN: CPT | Performed by: STUDENT IN AN ORGANIZED HEALTH CARE EDUCATION/TRAINING PROGRAM

## 2025-08-15 RX ORDER — SILVER SULFADIAZINE 10 G/1000G
CREAM TOPICAL DAILY
Qty: 50 G | Refills: 1 | Status: SHIPPED | OUTPATIENT
Start: 2025-08-15

## 2025-09-04 ENCOUNTER — APPOINTMENT (OUTPATIENT)
Dept: ORTHOPEDIC SURGERY | Facility: CLINIC | Age: 55
End: 2025-09-04
Payer: COMMERCIAL

## 2026-01-26 ENCOUNTER — APPOINTMENT (OUTPATIENT)
Dept: PRIMARY CARE | Facility: CLINIC | Age: 56
End: 2026-01-26
Payer: COMMERCIAL

## (undated) DEVICE — INTENDED FOR TISSUE SEPARATION, AND OTHER PROCEDURES THAT REQUIRE A SHARP SURGICAL BLADE TO PUNCTURE OR CUT.: Brand: BARD-PARKER ® CARBON RIB-BACK BLADES

## (undated) DEVICE — SYRINGE IRRIG 60ML SFT PLIABLE BLB EZ TO GRP 1 HND USE W/

## (undated) DEVICE — DBD-PACK,EENT,SIRUS,PK II: Brand: MEDLINE

## (undated) DEVICE — TUBING, SUCTION, 1/4" X 10', STRAIGHT: Brand: MEDLINE

## (undated) DEVICE — SYRINGE MED 10ML LUERLOCK TIP W/O SFTY DISP

## (undated) DEVICE — LABEL MED MINI W/ MARKER

## (undated) DEVICE — TOWEL,OR,DSP,ST,BLUE,STD,4/PK,20PK/CS: Brand: MEDLINE

## (undated) DEVICE — SUTURE PROL SZ 2-0 L30IN NONABSORBABLE BLU L60MM KS STR REV 8623H

## (undated) DEVICE — GLOVE SURG SZ 75 STD WHT LTX SYN POLYMER BEAD REINF ANTI RL

## (undated) DEVICE — COUNTER NDL 40 COUNT HLD 70 FOAM BLK ADH W/ MAG

## (undated) DEVICE — NEEDLE HYPO 25GA L1.5IN BLU POLYPR HUB S STL REG BVL STR

## (undated) DEVICE — GAUZE,SPONGE,2"X2",8PLY,STERILE,LF,2'S: Brand: MEDLINE

## (undated) DEVICE — KIT,ANTI FOG,W/SPONGE & FLUID,SOFT PACK: Brand: MEDLINE

## (undated) DEVICE — SUTURE SZ 3-0 L27IN ABSRB BRN SH-1 L22MM 1/2 CIR SGL ARMED G182H

## (undated) DEVICE — GAUZE,SPONGE,4"X4",16PLY,XRAY,STRL,LF: Brand: MEDLINE

## (undated) DEVICE — SPLINT 1522000 20PK PAIR SIMPLESPLINTS

## (undated) DEVICE — TELFA NON-ADHERENT ABSORBENT DRESSING: Brand: TELFA

## (undated) DEVICE — SPONGE,NEURO,1"X3",XR,STRL,LF,10/PK: Brand: MEDLINE